# Patient Record
Sex: FEMALE | Race: WHITE
[De-identification: names, ages, dates, MRNs, and addresses within clinical notes are randomized per-mention and may not be internally consistent; named-entity substitution may affect disease eponyms.]

---

## 2017-10-05 ENCOUNTER — HOSPITAL ENCOUNTER (OUTPATIENT)
Dept: HOSPITAL 83 - LAB | Age: 62
Discharge: HOME | End: 2017-10-05
Attending: PHYSICIAN ASSISTANT
Payer: COMMERCIAL

## 2017-10-05 DIAGNOSIS — K21.9: ICD-10-CM

## 2017-10-05 DIAGNOSIS — B19.21: ICD-10-CM

## 2017-10-05 DIAGNOSIS — E55.9: ICD-10-CM

## 2017-10-05 DIAGNOSIS — M05.79: ICD-10-CM

## 2017-10-05 DIAGNOSIS — E11.9: ICD-10-CM

## 2017-10-05 DIAGNOSIS — E03.9: ICD-10-CM

## 2017-10-05 DIAGNOSIS — E78.00: Primary | ICD-10-CM

## 2017-10-05 DIAGNOSIS — Z79.899: ICD-10-CM

## 2017-10-05 LAB
25(OH)D3 SERPL-MCNC: 27.6 NG/ML (ref 30–100)
ALBUMIN SERPL-MCNC: 3.6 GM/DL (ref 3.1–4.5)
ALP SERPL-CCNC: 88 U/L (ref 45–117)
ALT SERPL W P-5'-P-CCNC: 112 U/L (ref 12–78)
AST SERPL-CCNC: 109 IU/L (ref 3–35)
BASOPHILS # BLD AUTO: 0 10*3/UL (ref 0–0.1)
BASOPHILS NFR BLD AUTO: 0.4 % (ref 0–1)
BUN SERPL-MCNC: 17 MG/DL (ref 7–24)
CHLORIDE SERPL-SCNC: 105 MMOL/L (ref 98–107)
CHOLEST SERPL-MCNC: 150 MG/DL (ref ?–200)
CREAT SERPL-MCNC: 0.64 MG/DL (ref 0.55–1.02)
EOSINOPHIL # BLD AUTO: 0 10*3/UL (ref 0–0.4)
EOSINOPHIL # BLD AUTO: 0.2 % (ref 1–4)
ERYTHROCYTE [DISTWIDTH] IN BLOOD BY AUTOMATED COUNT: 12.6 % (ref 0–14.5)
HCT VFR BLD AUTO: 39.1 % (ref 37–47)
HDLC SERPL-MCNC: 81 MG/DL (ref 40–60)
HGB BLD-MCNC: 13.7 G/DL (ref 12–16)
LDLC SERPL DIRECT ASSAY-MCNC: 49 MG/DL (ref 9–159)
LYMPHOCYTES # BLD AUTO: 1.5 10*3/UL (ref 1.3–4.4)
LYMPHOCYTES NFR BLD AUTO: 28.6 % (ref 27–41)
MCH RBC QN AUTO: 32.2 PG (ref 27–31)
MCHC RBC AUTO-ENTMCNC: 35 G/DL (ref 33–37)
MCV RBC AUTO: 92 FL (ref 81–99)
MONOCYTES # BLD AUTO: 0.8 10*3/UL (ref 0.1–1)
MONOCYTES NFR BLD MANUAL: 15.5 % (ref 3–9)
NEUT #: 2.9 10*3/UL (ref 2.3–7.9)
NEUT %: 54.7 % (ref 47–73)
NRBC BLD QL AUTO: 0 % (ref 0–0)
PLATELET # BLD AUTO: 219 10*3/UL (ref 130–400)
PMV BLD AUTO: 9.7 FL (ref 9.6–12.3)
POTASSIUM SERPL-SCNC: 3.8 MMOL/L (ref 3.5–5.1)
PROT SERPL-MCNC: 6.6 GM/DL (ref 6.4–8.2)
RBC # BLD AUTO: 4.25 10*6/UL (ref 4.1–5.1)
SODIUM SERPL-SCNC: 141 MMOL/L (ref 136–145)
T4 FREE SERPL-MCNC: 1.67 NG/DL (ref 0.76–1.46)
TRIGL SERPL-MCNC: 98 MG/DL (ref ?–150)
VLDLC SERPL CALC-MCNC: 20 MG/DL (ref 6–40)
WBC NRBC COR # BLD AUTO: 5.2 10*3/UL (ref 4.8–10.8)

## 2017-10-07 LAB
HCV RNA SERPL NAA+PROBE-ACNC: (no result) IU/ML
HCV RNA SERPL NAA+PROBE-ACNC: (no result) IU/ML
HCV RNA SERPL NAA+PROBE-LOG IU: 7.16 {LOG_IU}/ML

## 2017-12-04 ENCOUNTER — HOSPITAL ENCOUNTER (OUTPATIENT)
Dept: HOSPITAL 83 - RAD | Age: 62
Discharge: HOME | End: 2017-12-04
Attending: ANESTHESIOLOGY
Payer: COMMERCIAL

## 2017-12-04 DIAGNOSIS — Z96.653: ICD-10-CM

## 2017-12-04 DIAGNOSIS — M06.9: ICD-10-CM

## 2017-12-04 DIAGNOSIS — Z96.643: ICD-10-CM

## 2017-12-04 DIAGNOSIS — M47.896: Primary | ICD-10-CM

## 2017-12-04 DIAGNOSIS — G89.29: ICD-10-CM

## 2018-04-30 ENCOUNTER — HOSPITAL ENCOUNTER (OUTPATIENT)
Age: 63
Discharge: HOME | End: 2018-05-02
Payer: COMMERCIAL

## 2018-04-30 LAB
ALBUMIN SERPL-MCNC: 4.6 G/DL (ref 3.5–5.2)
ALP BLD-CCNC: 83 U/L (ref 35–104)
ALT SERPL-CCNC: 62 U/L (ref 0–32)
ANION GAP SERPL CALCULATED.3IONS-SCNC: 17 MMOL/L (ref 7–16)
AST SERPL-CCNC: 65 U/L (ref 0–31)
BASOPHILS ABSOLUTE: 0.03 E9/L (ref 0–0.2)
BASOPHILS RELATIVE PERCENT: 0.7 % (ref 0–2)
BILIRUB SERPL-MCNC: 0.4 MG/DL (ref 0–1.2)
BUN BLDV-MCNC: 10 MG/DL (ref 8–23)
C-REACTIVE PROTEIN: <0.1 MG/DL (ref 0–0.4)
CALCIUM SERPL-MCNC: 9.6 MG/DL (ref 8.6–10.2)
CHLORIDE BLD-SCNC: 101 MMOL/L (ref 98–107)
CO2: 26 MMOL/L (ref 22–29)
CREAT SERPL-MCNC: 0.5 MG/DL (ref 0.5–1)
EOSINOPHILS ABSOLUTE: 0.07 E9/L (ref 0.05–0.5)
EOSINOPHILS RELATIVE PERCENT: 1.7 % (ref 0–6)
GFR AFRICAN AMERICAN: >60
GFR NON-AFRICAN AMERICAN: >60 ML/MIN/1.73
GLUCOSE BLD-MCNC: 264 MG/DL (ref 74–109)
HCT VFR BLD CALC: 39.6 % (ref 34–48)
HEMOGLOBIN: 13.2 G/DL (ref 11.5–15.5)
IMMATURE GRANULOCYTES #: 0 E9/L
IMMATURE GRANULOCYTES %: 0 % (ref 0–5)
LYMPHOCYTES ABSOLUTE: 1.63 E9/L (ref 1.5–4)
LYMPHOCYTES RELATIVE PERCENT: 39.1 % (ref 20–42)
MCH RBC QN AUTO: 32.1 PG (ref 26–35)
MCHC RBC AUTO-ENTMCNC: 33.3 % (ref 32–34.5)
MCV RBC AUTO: 96.4 FL (ref 80–99.9)
MONOCYTES ABSOLUTE: 0.5 E9/L (ref 0.1–0.95)
MONOCYTES RELATIVE PERCENT: 12 % (ref 2–12)
NEUTROPHILS ABSOLUTE: 1.94 E9/L (ref 1.8–7.3)
NEUTROPHILS RELATIVE PERCENT: 46.5 % (ref 43–80)
PDW BLD-RTO: 13.2 FL (ref 11.5–15)
PLATELET # BLD: 195 E9/L (ref 130–450)
PMV BLD AUTO: 9.6 FL (ref 7–12)
POTASSIUM SERPL-SCNC: 3.9 MMOL/L (ref 3.5–5)
RBC # BLD: 4.11 E12/L (ref 3.5–5.5)
SODIUM BLD-SCNC: 144 MMOL/L (ref 132–146)
TOTAL PROTEIN: 6.6 G/DL (ref 6.4–8.3)
WBC # BLD: 4.2 E9/L (ref 4.5–11.5)

## 2018-04-30 PROCEDURE — 36415 COLL VENOUS BLD VENIPUNCTURE: CPT

## 2018-04-30 PROCEDURE — 85025 COMPLETE CBC W/AUTO DIFF WBC: CPT

## 2018-04-30 PROCEDURE — 87522 HEPATITIS C REVRS TRNSCRPJ: CPT

## 2018-04-30 PROCEDURE — 86140 C-REACTIVE PROTEIN: CPT

## 2018-04-30 PROCEDURE — 80053 COMPREHEN METABOLIC PANEL: CPT

## 2018-05-02 LAB
EER HCV RNA QNT PCR: ABNORMAL
HCV RNA QNT REAL-TIME PCR INTERP: DETECTED
HCV RNA, QUANTITATIVE REAL TIME PCR: 7.3 LOG IU
HEPATITIS C RNA PCR QUANT: ABNORMAL IU/ML

## 2018-07-12 ENCOUNTER — HOSPITAL ENCOUNTER (OUTPATIENT)
Age: 63
Discharge: HOME | End: 2018-07-14
Payer: COMMERCIAL

## 2018-07-12 LAB
ALBUMIN SERPL-MCNC: 4.4 G/DL (ref 3.5–5.2)
ALP BLD-CCNC: 84 U/L (ref 35–104)
ALT SERPL-CCNC: 101 U/L (ref 0–32)
ANION GAP SERPL CALCULATED.3IONS-SCNC: 13 MMOL/L (ref 7–16)
AST SERPL-CCNC: 99 U/L (ref 0–31)
BASOPHILS ABSOLUTE: 0.04 E9/L (ref 0–0.2)
BASOPHILS RELATIVE PERCENT: 0.7 % (ref 0–2)
BILIRUB SERPL-MCNC: 0.6 MG/DL (ref 0–1.2)
BUN BLDV-MCNC: 15 MG/DL (ref 8–23)
CALCIUM SERPL-MCNC: 9.7 MG/DL (ref 8.6–10.2)
CHLORIDE BLD-SCNC: 100 MMOL/L (ref 98–107)
CO2: 25 MMOL/L (ref 22–29)
CREAT SERPL-MCNC: 0.6 MG/DL (ref 0.5–1)
EOSINOPHILS ABSOLUTE: 0.06 E9/L (ref 0.05–0.5)
EOSINOPHILS RELATIVE PERCENT: 1 % (ref 0–6)
GFR AFRICAN AMERICAN: >60
GFR NON-AFRICAN AMERICAN: >60 ML/MIN/1.73
GLUCOSE BLD-MCNC: 262 MG/DL (ref 74–109)
HCT VFR BLD CALC: 41.7 % (ref 34–48)
HEMOGLOBIN: 14.2 G/DL (ref 11.5–15.5)
IMMATURE GRANULOCYTES #: 0.02 E9/L
IMMATURE GRANULOCYTES %: 0.3 % (ref 0–5)
LYMPHOCYTES ABSOLUTE: 1.93 E9/L (ref 1.5–4)
LYMPHOCYTES RELATIVE PERCENT: 31.4 % (ref 20–42)
MCH RBC QN AUTO: 32.6 PG (ref 26–35)
MCHC RBC AUTO-ENTMCNC: 34.1 % (ref 32–34.5)
MCV RBC AUTO: 95.6 FL (ref 80–99.9)
MONOCYTES ABSOLUTE: 0.57 E9/L (ref 0.1–0.95)
MONOCYTES RELATIVE PERCENT: 9.3 % (ref 2–12)
NEUTROPHILS ABSOLUTE: 3.53 E9/L (ref 1.8–7.3)
NEUTROPHILS RELATIVE PERCENT: 57.3 % (ref 43–80)
PDW BLD-RTO: 12.3 FL (ref 11.5–15)
PLATELET # BLD: 209 E9/L (ref 130–450)
PMV BLD AUTO: 10 FL (ref 7–12)
POTASSIUM SERPL-SCNC: 4.8 MMOL/L (ref 3.5–5)
RBC # BLD: 4.36 E12/L (ref 3.5–5.5)
SODIUM BLD-SCNC: 138 MMOL/L (ref 132–146)
TOTAL PROTEIN: 7 G/DL (ref 6.4–8.3)
WBC # BLD: 6.2 E9/L (ref 4.5–11.5)

## 2018-07-12 PROCEDURE — 36415 COLL VENOUS BLD VENIPUNCTURE: CPT

## 2018-07-12 PROCEDURE — 85025 COMPLETE CBC W/AUTO DIFF WBC: CPT

## 2018-07-12 PROCEDURE — 80053 COMPREHEN METABOLIC PANEL: CPT

## 2018-10-18 ENCOUNTER — HOSPITAL ENCOUNTER (OUTPATIENT)
Dept: HOSPITAL 83 - US | Age: 63
Discharge: HOME | End: 2018-10-18
Attending: PHYSICIAN ASSISTANT
Payer: COMMERCIAL

## 2018-10-18 DIAGNOSIS — E11.9: ICD-10-CM

## 2018-10-18 DIAGNOSIS — I10: ICD-10-CM

## 2018-10-18 DIAGNOSIS — M79.606: Primary | ICD-10-CM

## 2018-11-01 ENCOUNTER — HOSPITAL ENCOUNTER (OUTPATIENT)
Age: 63
Discharge: HOME | End: 2018-11-03
Payer: COMMERCIAL

## 2018-11-01 LAB
ALBUMIN SERPL-MCNC: 4.5 G/DL (ref 3.5–5.2)
ALP BLD-CCNC: 89 U/L (ref 35–104)
ALT SERPL-CCNC: 110 U/L (ref 0–32)
ANION GAP SERPL CALCULATED.3IONS-SCNC: 16 MMOL/L (ref 7–16)
AST SERPL-CCNC: 119 U/L (ref 0–31)
BASOPHILS ABSOLUTE: 0.04 E9/L (ref 0–0.2)
BASOPHILS RELATIVE PERCENT: 0.7 % (ref 0–2)
BILIRUB SERPL-MCNC: 0.7 MG/DL (ref 0–1.2)
BUN BLDV-MCNC: 13 MG/DL (ref 8–23)
CALCIUM SERPL-MCNC: 9.6 MG/DL (ref 8.6–10.2)
CHLORIDE BLD-SCNC: 97 MMOL/L (ref 98–107)
CO2: 24 MMOL/L (ref 22–29)
CREAT SERPL-MCNC: 0.6 MG/DL (ref 0.5–1)
EOSINOPHILS ABSOLUTE: 0.04 E9/L (ref 0.05–0.5)
EOSINOPHILS RELATIVE PERCENT: 0.7 % (ref 0–6)
GFR AFRICAN AMERICAN: >60
GFR NON-AFRICAN AMERICAN: >60 ML/MIN/1.73
GLUCOSE BLD-MCNC: 224 MG/DL (ref 74–109)
HCT VFR BLD CALC: 44.1 % (ref 34–48)
HEMOGLOBIN: 14.5 G/DL (ref 11.5–15.5)
IMMATURE GRANULOCYTES #: 0.02 E9/L
IMMATURE GRANULOCYTES %: 0.4 % (ref 0–5)
LYMPHOCYTES ABSOLUTE: 1.53 E9/L (ref 1.5–4)
LYMPHOCYTES RELATIVE PERCENT: 27.5 % (ref 20–42)
MCH RBC QN AUTO: 32.5 PG (ref 26–35)
MCHC RBC AUTO-ENTMCNC: 32.9 % (ref 32–34.5)
MCV RBC AUTO: 98.9 FL (ref 80–99.9)
MONOCYTES ABSOLUTE: 0.58 E9/L (ref 0.1–0.95)
MONOCYTES RELATIVE PERCENT: 10.4 % (ref 2–12)
NEUTROPHILS ABSOLUTE: 3.35 E9/L (ref 1.8–7.3)
NEUTROPHILS RELATIVE PERCENT: 60.3 % (ref 43–80)
PDW BLD-RTO: 12.7 FL (ref 11.5–15)
PLATELET # BLD: 238 E9/L (ref 130–450)
PMV BLD AUTO: 10.2 FL (ref 7–12)
POTASSIUM SERPL-SCNC: 4.9 MMOL/L (ref 3.5–5)
RBC # BLD: 4.46 E12/L (ref 3.5–5.5)
SODIUM BLD-SCNC: 137 MMOL/L (ref 132–146)
TOTAL PROTEIN: 6.9 G/DL (ref 6.4–8.3)
VITAMIN D 25-HYDROXY: 58 NG/ML (ref 30–100)
WBC # BLD: 5.6 E9/L (ref 4.5–11.5)

## 2018-11-01 PROCEDURE — 36415 COLL VENOUS BLD VENIPUNCTURE: CPT

## 2018-11-01 PROCEDURE — 85025 COMPLETE CBC W/AUTO DIFF WBC: CPT

## 2018-11-01 PROCEDURE — 80053 COMPREHEN METABOLIC PANEL: CPT

## 2018-11-01 PROCEDURE — 82306 VITAMIN D 25 HYDROXY: CPT

## 2019-06-06 ENCOUNTER — HOSPITAL ENCOUNTER (OUTPATIENT)
Dept: HOSPITAL 83 - LAB | Age: 64
Discharge: HOME | End: 2019-06-06
Attending: NURSE PRACTITIONER
Payer: COMMERCIAL

## 2019-06-06 DIAGNOSIS — B19.20: Primary | ICD-10-CM

## 2019-06-06 LAB
ALBUMIN SERPL-MCNC: 3.9 GM/DL (ref 3.1–4.5)
ALP SERPL-CCNC: 75 U/L (ref 45–117)
ALT SERPL W P-5'-P-CCNC: 31 U/L (ref 12–78)
AST SERPL-CCNC: 20 IU/L (ref 3–35)
ERYTHROCYTE [DISTWIDTH] IN BLOOD BY AUTOMATED COUNT: 13.3 % (ref 0–14.5)
HCT VFR BLD AUTO: 37.5 % (ref 37–47)
HGB BLD-MCNC: 12.2 G/DL (ref 12–16)
INR BLD: 1 (ref 2–3.5)
MCH RBC QN AUTO: 28.9 PG (ref 27–31)
MCHC RBC AUTO-ENTMCNC: 32.5 G/DL (ref 33–37)
MCV RBC AUTO: 88.9 FL (ref 81–99)
NRBC BLD QL AUTO: 0 % (ref 0–0)
PLATELET # BLD AUTO: 227 10*3/UL (ref 130–400)
PMV BLD AUTO: 9.5 FL (ref 9.6–12.3)
PROT SERPL-MCNC: 7.2 GM/DL (ref 6.4–8.2)
RBC # BLD AUTO: 4.22 10*6/UL (ref 4.1–5.1)
WBC NRBC COR # BLD AUTO: 6 10*3/UL (ref 4.8–10.8)

## 2019-07-18 ENCOUNTER — HOSPITAL ENCOUNTER (OUTPATIENT)
Dept: HOSPITAL 83 - LAB | Age: 64
Discharge: HOME | End: 2019-07-18
Attending: NURSE PRACTITIONER
Payer: COMMERCIAL

## 2019-07-18 DIAGNOSIS — B18.2: Primary | ICD-10-CM

## 2019-07-18 LAB
ABSOLUTE BASO #: 0 10*3/UL (ref 0–0.1)
ABSOLUTE EOS #: 0.1 10*3/UL (ref 0–0.4)
ABSOLUTE NEUT #: 4.2 10*3/UL (ref 2.3–7.9)
ALBUMIN SERPL-MCNC: 4 GM/DL (ref 3.1–4.5)
ALBUMIN: 4 GM/DL (ref 3.1–4.5)
ALP BLD-CCNC: 82 U/L (ref 45–117)
ALP SERPL-CCNC: 82 U/L (ref 45–117)
ALT SERPL W P-5'-P-CCNC: 20 U/L (ref 12–78)
ALT SERPL-CCNC: 20 U/L (ref 12–78)
AMMONIA: < 10 UMOL/L (ref 11–32)
AST SERPL-CCNC: 24 IU/L (ref 3–35)
AST SERPL-CCNC: 24 IU/L (ref 3–35)
BASOPHILS # BLD AUTO: 0 10*3/UL (ref 0–0.1)
BASOPHILS %: 0.3 % (ref 0–1)
BASOPHILS NFR BLD AUTO: 0.3 % (ref 0–1)
BILIRUB SERPL-MCNC: 0.5 MG/DL (ref 0.2–1)
BILIRUBIN DIRECT: 0.2 MG/DL (ref 0–0.2)
EOSINOPHIL # BLD AUTO: 0.1 10*3/UL (ref 0–0.4)
EOSINOPHIL # BLD AUTO: 1.6 % (ref 1–4)
EOSINOPHILS %: 1.6 % (ref 1–4)
ERYTHROCYTE [DISTWIDTH] IN BLOOD BY AUTOMATED COUNT: 13.7 % (ref 0–14.5)
HCT VFR BLD AUTO: 40.3 % (ref 37–47)
HCT VFR BLD CALC: 40.3 % (ref 37–47)
HEMOGLOBIN: 12.9 G/DL (ref 12–16)
HGB BLD-MCNC: 12.9 G/DL (ref 12–16)
IMMATURE GRANULOCYTES #: 0 10*3/UL (ref 0–0.1)
IMMATURE GRANULOCYTES: 0.1 % (ref 0–1)
INR BLD: 1 (ref 2–3.5)
INR BLD: 1 (ref 2–3.5)
LYMPHOCYTE %: 27 % (ref 27–41)
LYMPHOCYTES # BLD AUTO: 1.8 10*3/UL (ref 1.3–4.4)
LYMPHOCYTES # BLD: 1.8 10*3/UL (ref 1.3–4.4)
LYMPHOCYTES NFR BLD AUTO: 27 % (ref 27–41)
MCH RBC QN AUTO: 28.9 PG (ref 27–31)
MCH RBC QN AUTO: 28.9 PG (ref 27–31)
MCHC RBC AUTO-ENTMCNC: 32 G/DL (ref 33–37)
MCHC RBC AUTO-ENTMCNC: 32 G/DL (ref 33–37)
MCV RBC AUTO: 90.2 FL (ref 81–99)
MCV RBC AUTO: 90.2 FL (ref 81–99)
MONOCYTES # BLD AUTO: 0.6 10*3/UL (ref 0.1–1)
MONOCYTES # BLD: 0.6 10*3/UL (ref 0.1–1)
MONOCYTES %: 8.5 % (ref 3–9)
MONOCYTES NFR BLD MANUAL: 8.5 % (ref 3–9)
NEUT #: 4.2 10*3/UL (ref 2.3–7.9)
NEUT %: 62.5 % (ref 47–73)
NEUTROPHILS %: 62.5 % (ref 47–73)
NRBC BLD QL AUTO: 0 10*3/UL (ref 0–0)
NUCLEATED RED BLOOD CELLS: 0 % (ref 0–0)
PDW BLD-RTO: 13.7 % (ref 0–14.5)
PLATELET # BLD AUTO: 247 10*3/UL (ref 130–400)
PLATELET # BLD: 247 10*3/UL (ref 130–400)
PMV BLD AUTO: 9.7 FL (ref 9.6–12.3)
PMV BLD AUTO: 9.7 FL (ref 9.6–12.3)
PROT SERPL-MCNC: 7.2 GM/DL (ref 6.4–8.2)
PROTHROMBIN TIME: 11.5 SECONDS (ref 8.9–12.2)
RBC # BLD AUTO: 4.47 10*6/UL (ref 4.1–5.1)
RBC # BLD: 4.47 10*6/UL (ref 4.1–5.1)
TOTAL PROTEIN: 7.2 GM/DL (ref 6.4–8.2)
WBC # BLD: 6.8 10*3/UL (ref 4.8–10.8)
WBC NRBC COR # BLD AUTO: 6.8 10*3/UL (ref 4.8–10.8)

## 2019-08-16 ENCOUNTER — HOSPITAL ENCOUNTER (OUTPATIENT)
Age: 64
Discharge: HOME | End: 2019-08-18
Payer: COMMERCIAL

## 2019-08-16 LAB
ALBUMIN SERPL-MCNC: 4.4 G/DL (ref 3.5–5.2)
ALP BLD-CCNC: 60 U/L (ref 35–104)
ALT SERPL-CCNC: 16 U/L (ref 0–32)
ANION GAP SERPL CALCULATED.3IONS-SCNC: 16 MMOL/L (ref 7–16)
ANISOCYTOSIS: ABNORMAL
AST SERPL-CCNC: 24 U/L (ref 0–31)
ATYPICAL LYMPHOCYTE RELATIVE PERCENT: 1.8 % (ref 0–4)
BASOPHILS ABSOLUTE: 0 E9/L (ref 0–0.2)
BASOPHILS RELATIVE PERCENT: 0.3 % (ref 0–2)
BILIRUB SERPL-MCNC: 0.3 MG/DL (ref 0–1.2)
BUN BLDV-MCNC: 13 MG/DL (ref 8–23)
BURR CELLS: ABNORMAL
CALCIUM SERPL-MCNC: 10 MG/DL (ref 8.6–10.2)
CHLORIDE BLD-SCNC: 101 MMOL/L (ref 98–107)
CO2: 24 MMOL/L (ref 22–29)
CREAT SERPL-MCNC: 0.6 MG/DL (ref 0.5–1)
EOSINOPHILS ABSOLUTE: 0.05 E9/L (ref 0.05–0.5)
EOSINOPHILS RELATIVE PERCENT: 0.9 % (ref 0–6)
GFR AFRICAN AMERICAN: >60
GFR NON-AFRICAN AMERICAN: >60 ML/MIN/1.73
GLUCOSE BLD-MCNC: 196 MG/DL (ref 74–99)
HCT VFR BLD CALC: 42.4 % (ref 34–48)
HEMOGLOBIN: 12.4 G/DL (ref 11.5–15.5)
LYMPHOCYTES ABSOLUTE: 3 E9/L (ref 1.5–4)
LYMPHOCYTES RELATIVE PERCENT: 47.8 % (ref 20–42)
MCH RBC QN AUTO: 29.8 PG (ref 26–35)
MCHC RBC AUTO-ENTMCNC: 29.2 % (ref 32–34.5)
MCV RBC AUTO: 101.9 FL (ref 80–99.9)
MONOCYTES ABSOLUTE: 0.24 E9/L (ref 0.1–0.95)
MONOCYTES RELATIVE PERCENT: 4.4 % (ref 2–12)
NEUTROPHILS ABSOLUTE: 2.7 E9/L (ref 1.8–7.3)
NEUTROPHILS RELATIVE PERCENT: 45.1 % (ref 43–80)
PDW BLD-RTO: 15.6 FL (ref 11.5–15)
PLATELET # BLD: 231 E9/L (ref 130–450)
PMV BLD AUTO: 9.6 FL (ref 7–12)
POIKILOCYTES: ABNORMAL
POLYCHROMASIA: ABNORMAL
POTASSIUM SERPL-SCNC: 4.2 MMOL/L (ref 3.5–5)
RBC # BLD: 4.16 E12/L (ref 3.5–5.5)
SODIUM BLD-SCNC: 141 MMOL/L (ref 132–146)
TOTAL PROTEIN: 6.9 G/DL (ref 6.4–8.3)
WBC # BLD: 6 E9/L (ref 4.5–11.5)

## 2019-08-16 PROCEDURE — 80053 COMPREHEN METABOLIC PANEL: CPT

## 2019-08-16 PROCEDURE — 85025 COMPLETE CBC W/AUTO DIFF WBC: CPT

## 2019-08-16 PROCEDURE — 36415 COLL VENOUS BLD VENIPUNCTURE: CPT

## 2019-08-16 PROCEDURE — 86200 CCP ANTIBODY: CPT

## 2019-08-20 LAB — CCP IGG ANTIBODIES: 37 UNITS (ref 0–19)

## 2019-11-20 ENCOUNTER — HOSPITAL ENCOUNTER (OUTPATIENT)
Dept: HOSPITAL 83 - LAB | Age: 64
Discharge: HOME | End: 2019-11-20
Attending: PHYSICIAN ASSISTANT
Payer: COMMERCIAL

## 2019-11-20 DIAGNOSIS — I10: ICD-10-CM

## 2019-11-20 DIAGNOSIS — Z23: Primary | ICD-10-CM

## 2019-11-20 DIAGNOSIS — E78.00: ICD-10-CM

## 2019-11-20 DIAGNOSIS — E03.9: ICD-10-CM

## 2019-11-20 DIAGNOSIS — E11.9: ICD-10-CM

## 2019-11-20 DIAGNOSIS — B18.2: ICD-10-CM

## 2019-11-20 DIAGNOSIS — R60.0: ICD-10-CM

## 2019-11-20 LAB
ALBUMIN SERPL-MCNC: 4 GM/DL (ref 3.1–4.5)
ALP SERPL-CCNC: 64 U/L (ref 45–117)
ALT SERPL W P-5'-P-CCNC: 24 U/L (ref 12–78)
AST SERPL-CCNC: 27 IU/L (ref 3–35)
BUN SERPL-MCNC: 17 MG/DL (ref 7–24)
CHLORIDE SERPL-SCNC: 104 MMOL/L (ref 98–107)
CHOLEST SERPL-MCNC: 237 MG/DL (ref ?–200)
CREAT SERPL-MCNC: 0.68 MG/DL (ref 0.55–1.02)
ERYTHROCYTE [DISTWIDTH] IN BLOOD BY AUTOMATED COUNT: 12.6 % (ref 0–14.5)
HCT VFR BLD AUTO: 38.8 % (ref 37–47)
HDLC SERPL-MCNC: 76 MG/DL (ref 40–60)
HGB BLD-MCNC: 12.5 G/DL (ref 12–16)
LDLC SERPL DIRECT ASSAY-MCNC: 134 MG/DL (ref 9–159)
MCH RBC QN AUTO: 29.2 PG (ref 27–31)
MCHC RBC AUTO-ENTMCNC: 32.2 G/DL (ref 33–37)
MCV RBC AUTO: 90.7 FL (ref 81–99)
NRBC BLD QL AUTO: 0 10*3/UL (ref 0–0)
PLATELET # BLD AUTO: 255 10*3/UL (ref 130–400)
PMV BLD AUTO: 9.3 FL (ref 9.6–12.3)
POTASSIUM SERPL-SCNC: 3.8 MMOL/L (ref 3.5–5.1)
PROT SERPL-MCNC: 7.5 GM/DL (ref 6.4–8.2)
RBC # BLD AUTO: 4.28 10*6/UL (ref 4.1–5.1)
SODIUM SERPL-SCNC: 139 MMOL/L (ref 136–145)
T4 FREE SERPL-MCNC: 0.88 NG/DL (ref 0.76–1.46)
TRIGL SERPL-MCNC: 134 MG/DL (ref ?–150)
VLDLC SERPL CALC-MCNC: 27 MG/DL (ref 6–40)
WBC NRBC COR # BLD AUTO: 6 10*3/UL (ref 4.8–10.8)

## 2019-12-13 ENCOUNTER — HOSPITAL ENCOUNTER (OUTPATIENT)
Dept: HOSPITAL 83 - US | Age: 64
Discharge: HOME | End: 2019-12-13
Attending: NURSE PRACTITIONER
Payer: COMMERCIAL

## 2019-12-13 DIAGNOSIS — K74.60: Primary | ICD-10-CM

## 2019-12-13 DIAGNOSIS — B19.20: ICD-10-CM

## 2019-12-13 LAB
ABSOLUTE BASO #: 0 10*3/UL (ref 0–0.1)
ABSOLUTE EOS #: 0.1 10*3/UL (ref 0–0.4)
ABSOLUTE NEUT #: 3.8 10*3/UL (ref 2.3–7.9)
ALBUMIN SERPL-MCNC: 3.8 GM/DL (ref 3.1–4.5)
ALBUMIN: 3.8 GM/DL (ref 3.1–4.5)
ALP BLD-CCNC: 65 U/L (ref 45–117)
ALP SERPL-CCNC: 65 U/L (ref 45–117)
ALT SERPL W P-5'-P-CCNC: 21 U/L (ref 12–78)
ALT SERPL-CCNC: 21 U/L (ref 12–78)
AMMONIA: < 10 UMOL/L (ref 11–32)
AST SERPL-CCNC: 18 IU/L (ref 3–35)
AST SERPL-CCNC: 18 IU/L (ref 3–35)
BASOPHILS # BLD AUTO: 0 10*3/UL (ref 0–0.1)
BASOPHILS %: 0.5 % (ref 0–1)
BASOPHILS NFR BLD AUTO: 0.5 % (ref 0–1)
BILIRUB SERPL-MCNC: 0.4 MG/DL (ref 0.2–1)
BILIRUBIN DIRECT: 0.2 MG/DL (ref 0–0.2)
EOSINOPHIL # BLD AUTO: 0.1 10*3/UL (ref 0–0.4)
EOSINOPHIL # BLD AUTO: 2 % (ref 1–4)
EOSINOPHILS %: 2 % (ref 1–4)
ERYTHROCYTE [DISTWIDTH] IN BLOOD BY AUTOMATED COUNT: 13.2 % (ref 0–14.5)
HCT VFR BLD AUTO: 37.6 % (ref 37–47)
HCT VFR BLD CALC: 37.6 % (ref 37–47)
HEMOGLOBIN: 12.1 G/DL (ref 12–16)
HGB BLD-MCNC: 12.1 G/DL (ref 12–16)
IMMATURE GRANULOCYTES #: 0 10*3/UL (ref 0–0.1)
IMMATURE GRANULOCYTES: 0.3 % (ref 0–1)
LYMPHOCYTE %: 28.8 % (ref 27–41)
LYMPHOCYTES # BLD AUTO: 1.9 10*3/UL (ref 1.3–4.4)
LYMPHOCYTES # BLD: 1.9 10*3/UL (ref 1.3–4.4)
LYMPHOCYTES NFR BLD AUTO: 28.8 % (ref 27–41)
MCH RBC QN AUTO: 29.4 PG (ref 27–31)
MCH RBC QN AUTO: 29.4 PG (ref 27–31)
MCHC RBC AUTO-ENTMCNC: 32.2 G/DL (ref 33–37)
MCHC RBC AUTO-ENTMCNC: 32.2 G/DL (ref 33–37)
MCV RBC AUTO: 91.3 FL (ref 81–99)
MCV RBC AUTO: 91.3 FL (ref 81–99)
MONOCYTES # BLD AUTO: 0.7 10*3/UL (ref 0.1–1)
MONOCYTES # BLD: 0.7 10*3/UL (ref 0.1–1)
MONOCYTES %: 11.2 % (ref 3–9)
MONOCYTES NFR BLD MANUAL: 11.2 % (ref 3–9)
NEUT #: 3.8 10*3/UL (ref 2.3–7.9)
NEUT %: 57.2 % (ref 47–73)
NEUTROPHILS %: 57.2 % (ref 47–73)
NRBC BLD QL AUTO: 0 10*3/UL (ref 0–0)
NUCLEATED RED BLOOD CELLS: 0 % (ref 0–0)
PDW BLD-RTO: 13.2 % (ref 0–14.5)
PLATELET # BLD AUTO: 229 10*3/UL (ref 130–400)
PLATELET # BLD: 229 10*3/UL (ref 130–400)
PMV BLD AUTO: 9.4 FL (ref 9.6–12.3)
PMV BLD AUTO: 9.4 FL (ref 9.6–12.3)
PROT SERPL-MCNC: 6.9 GM/DL (ref 6.4–8.2)
RBC # BLD AUTO: 4.12 10*6/UL (ref 4.1–5.1)
RBC # BLD: 4.12 10*6/UL (ref 4.1–5.1)
TOTAL PROTEIN: 6.9 GM/DL (ref 6.4–8.2)
WBC # BLD: 6.6 10*3/UL (ref 4.8–10.8)
WBC NRBC COR # BLD AUTO: 6.6 10*3/UL (ref 4.8–10.8)

## 2019-12-14 LAB — AFP-TM SERPL-MCNC: 3.2 NG/ML (ref 0–8.3)

## 2019-12-16 ENCOUNTER — HOSPITAL ENCOUNTER (OUTPATIENT)
Age: 64
Discharge: HOME | End: 2019-12-18
Payer: COMMERCIAL

## 2019-12-16 LAB — AFP-TUMOR MARKER: 3.2 NG/ML (ref 0–8.3)

## 2019-12-16 PROCEDURE — 85025 COMPLETE CBC W/AUTO DIFF WBC: CPT

## 2019-12-16 PROCEDURE — 80053 COMPREHEN METABOLIC PANEL: CPT

## 2019-12-16 PROCEDURE — 36415 COLL VENOUS BLD VENIPUNCTURE: CPT

## 2019-12-16 PROCEDURE — 86140 C-REACTIVE PROTEIN: CPT

## 2019-12-17 LAB
ALBUMIN SERPL-MCNC: 4.3 G/DL (ref 3.5–5.2)
ALP BLD-CCNC: 56 U/L (ref 35–104)
ALT SERPL-CCNC: 14 U/L (ref 0–32)
ANION GAP SERPL CALCULATED.3IONS-SCNC: 13 MMOL/L (ref 7–16)
AST SERPL-CCNC: 23 U/L (ref 0–31)
BASOPHILS ABSOLUTE: 0.03 E9/L (ref 0–0.2)
BASOPHILS RELATIVE PERCENT: 0.4 % (ref 0–2)
BILIRUB SERPL-MCNC: 0.3 MG/DL (ref 0–1.2)
BUN BLDV-MCNC: 12 MG/DL (ref 8–23)
C-REACTIVE PROTEIN: <0.1 MG/DL (ref 0–0.4)
CALCIUM SERPL-MCNC: 9.8 MG/DL (ref 8.6–10.2)
CHLORIDE BLD-SCNC: 103 MMOL/L (ref 98–107)
CO2: 26 MMOL/L (ref 22–29)
CREAT SERPL-MCNC: 0.6 MG/DL (ref 0.5–1)
EOSINOPHILS ABSOLUTE: 0.12 E9/L (ref 0.05–0.5)
EOSINOPHILS RELATIVE PERCENT: 1.8 % (ref 0–6)
GFR AFRICAN AMERICAN: >60
GFR NON-AFRICAN AMERICAN: >60 ML/MIN/1.73
GLUCOSE BLD-MCNC: 111 MG/DL (ref 74–99)
HCT VFR BLD CALC: 38.6 % (ref 34–48)
HEMOGLOBIN: 11.6 G/DL (ref 11.5–15.5)
IMMATURE GRANULOCYTES #: 0.02 E9/L
IMMATURE GRANULOCYTES %: 0.3 % (ref 0–5)
LYMPHOCYTES ABSOLUTE: 1.7 E9/L (ref 1.5–4)
LYMPHOCYTES RELATIVE PERCENT: 24.9 % (ref 20–42)
MCH RBC QN AUTO: 28.4 PG (ref 26–35)
MCHC RBC AUTO-ENTMCNC: 30.1 % (ref 32–34.5)
MCV RBC AUTO: 94.6 FL (ref 80–99.9)
MONOCYTES ABSOLUTE: 0.64 E9/L (ref 0.1–0.95)
MONOCYTES RELATIVE PERCENT: 9.4 % (ref 2–12)
NEUTROPHILS ABSOLUTE: 4.31 E9/L (ref 1.8–7.3)
NEUTROPHILS RELATIVE PERCENT: 63.2 % (ref 43–80)
PDW BLD-RTO: 13.3 FL (ref 11.5–15)
PLATELET # BLD: 245 E9/L (ref 130–450)
PMV BLD AUTO: 10.1 FL (ref 7–12)
POTASSIUM SERPL-SCNC: 4.1 MMOL/L (ref 3.5–5)
RBC # BLD: 4.08 E12/L (ref 3.5–5.5)
SODIUM BLD-SCNC: 142 MMOL/L (ref 132–146)
TOTAL PROTEIN: 7 G/DL (ref 6.4–8.3)
WBC # BLD: 6.8 E9/L (ref 4.5–11.5)

## 2020-03-23 ENCOUNTER — HOSPITAL ENCOUNTER (OUTPATIENT)
Age: 65
Discharge: HOME | End: 2020-03-25
Payer: COMMERCIAL

## 2020-03-23 LAB
ALBUMIN SERPL-MCNC: 4 G/DL (ref 3.5–5.2)
ALP BLD-CCNC: 75 U/L (ref 35–104)
ALT SERPL-CCNC: 7 U/L (ref 0–32)
ANION GAP SERPL CALCULATED.3IONS-SCNC: 13 MMOL/L (ref 7–16)
AST SERPL-CCNC: 18 U/L (ref 0–31)
BASOPHILS ABSOLUTE: 0.05 E9/L (ref 0–0.2)
BASOPHILS RELATIVE PERCENT: 0.6 % (ref 0–2)
BILIRUB SERPL-MCNC: 0.3 MG/DL (ref 0–1.2)
BUN BLDV-MCNC: 12 MG/DL (ref 8–23)
CALCIUM SERPL-MCNC: 10.1 MG/DL (ref 8.6–10.2)
CHLORIDE BLD-SCNC: 97 MMOL/L (ref 98–107)
CO2: 27 MMOL/L (ref 22–29)
CREAT SERPL-MCNC: 0.5 MG/DL (ref 0.5–1)
EOSINOPHILS ABSOLUTE: 0.16 E9/L (ref 0.05–0.5)
EOSINOPHILS RELATIVE PERCENT: 2 % (ref 0–6)
GFR AFRICAN AMERICAN: >60
GFR NON-AFRICAN AMERICAN: >60 ML/MIN/1.73
GLUCOSE BLD-MCNC: 162 MG/DL (ref 74–99)
HCT VFR BLD CALC: 40.5 % (ref 34–48)
HEMOGLOBIN: 12 G/DL (ref 11.5–15.5)
IMMATURE GRANULOCYTES #: 0.03 E9/L
IMMATURE GRANULOCYTES %: 0.4 % (ref 0–5)
LYMPHOCYTES ABSOLUTE: 1.58 E9/L (ref 1.5–4)
LYMPHOCYTES RELATIVE PERCENT: 19.3 % (ref 20–42)
MCH RBC QN AUTO: 25.5 PG (ref 26–35)
MCHC RBC AUTO-ENTMCNC: 29.6 % (ref 32–34.5)
MCV RBC AUTO: 86.2 FL (ref 80–99.9)
MONOCYTES ABSOLUTE: 0.7 E9/L (ref 0.1–0.95)
MONOCYTES RELATIVE PERCENT: 8.5 % (ref 2–12)
NEUTROPHILS ABSOLUTE: 5.67 E9/L (ref 1.8–7.3)
NEUTROPHILS RELATIVE PERCENT: 69.2 % (ref 43–80)
PDW BLD-RTO: 13.7 FL (ref 11.5–15)
PLATELET # BLD: 400 E9/L (ref 130–450)
PMV BLD AUTO: 9.4 FL (ref 7–12)
POTASSIUM SERPL-SCNC: 4.1 MMOL/L (ref 3.5–5)
RBC # BLD: 4.7 E12/L (ref 3.5–5.5)
SODIUM BLD-SCNC: 137 MMOL/L (ref 132–146)
TOTAL PROTEIN: 6.9 G/DL (ref 6.4–8.3)
WBC # BLD: 8.2 E9/L (ref 4.5–11.5)

## 2020-03-23 PROCEDURE — 82784 ASSAY IGA/IGD/IGG/IGM EACH: CPT

## 2020-03-23 PROCEDURE — 80053 COMPREHEN METABOLIC PANEL: CPT

## 2020-03-23 PROCEDURE — 85025 COMPLETE CBC W/AUTO DIFF WBC: CPT

## 2020-03-25 LAB
IGA: 137 MG/DL (ref 70–400)
IGG: 568 MG/DL (ref 700–1600)
IGM: 70 MG/DL (ref 40–230)

## 2020-08-27 ENCOUNTER — HOSPITAL ENCOUNTER (OUTPATIENT)
Age: 65
Discharge: HOME | End: 2020-08-29
Payer: COMMERCIAL

## 2020-08-27 LAB
BASOPHILS ABSOLUTE: 0.06 E9/L (ref 0–0.2)
BASOPHILS RELATIVE PERCENT: 0.6 % (ref 0–2)
EOSINOPHILS ABSOLUTE: 0.12 E9/L (ref 0.05–0.5)
EOSINOPHILS RELATIVE PERCENT: 1.3 % (ref 0–6)
HCT VFR BLD CALC: 42.9 % (ref 34–48)
HEMOGLOBIN: 12.9 G/DL (ref 11.5–15.5)
IMMATURE GRANULOCYTES #: 0.03 E9/L
IMMATURE GRANULOCYTES %: 0.3 % (ref 0–5)
LYMPHOCYTES ABSOLUTE: 1.75 E9/L (ref 1.5–4)
LYMPHOCYTES RELATIVE PERCENT: 18.8 % (ref 20–42)
MCH RBC QN AUTO: 26.8 PG (ref 26–35)
MCHC RBC AUTO-ENTMCNC: 30.1 % (ref 32–34.5)
MCV RBC AUTO: 89 FL (ref 80–99.9)
MONOCYTES ABSOLUTE: 0.79 E9/L (ref 0.1–0.95)
MONOCYTES RELATIVE PERCENT: 8.5 % (ref 2–12)
NEUTROPHILS ABSOLUTE: 6.54 E9/L (ref 1.8–7.3)
NEUTROPHILS RELATIVE PERCENT: 70.5 % (ref 43–80)
PDW BLD-RTO: 14.3 FL (ref 11.5–15)
PLATELET # BLD: 273 E9/L (ref 130–450)
PMV BLD AUTO: 10.4 FL (ref 7–12)
RBC # BLD: 4.82 E12/L (ref 3.5–5.5)
WBC # BLD: 9.3 E9/L (ref 4.5–11.5)

## 2020-08-27 PROCEDURE — 86431 RHEUMATOID FACTOR QUANT: CPT

## 2020-08-27 PROCEDURE — 36415 COLL VENOUS BLD VENIPUNCTURE: CPT

## 2020-08-27 PROCEDURE — 87522 HEPATITIS C REVRS TRNSCRPJ: CPT

## 2020-08-27 PROCEDURE — 80053 COMPREHEN METABOLIC PANEL: CPT

## 2020-08-27 PROCEDURE — 85025 COMPLETE CBC W/AUTO DIFF WBC: CPT

## 2020-08-27 PROCEDURE — 86200 CCP ANTIBODY: CPT

## 2020-08-28 LAB
ALBUMIN SERPL-MCNC: 4.4 G/DL (ref 3.5–5.2)
ALP BLD-CCNC: 72 U/L (ref 35–104)
ALT SERPL-CCNC: 10 U/L (ref 0–32)
ANION GAP SERPL CALCULATED.3IONS-SCNC: 15 MMOL/L (ref 7–16)
AST SERPL-CCNC: 19 U/L (ref 0–31)
BILIRUB SERPL-MCNC: 0.3 MG/DL (ref 0–1.2)
BUN BLDV-MCNC: 18 MG/DL (ref 8–23)
CALCIUM SERPL-MCNC: 10.3 MG/DL (ref 8.6–10.2)
CHLORIDE BLD-SCNC: 99 MMOL/L (ref 98–107)
CO2: 26 MMOL/L (ref 22–29)
CREAT SERPL-MCNC: 0.6 MG/DL (ref 0.5–1)
GFR AFRICAN AMERICAN: >60
GFR NON-AFRICAN AMERICAN: >60 ML/MIN/1.73
GLUCOSE BLD-MCNC: 219 MG/DL (ref 74–99)
POTASSIUM SERPL-SCNC: 4.5 MMOL/L (ref 3.5–5)
RHEUMATOID FACTOR: <10 IU/ML (ref 0–13)
SODIUM BLD-SCNC: 140 MMOL/L (ref 132–146)
TOTAL PROTEIN: 6.9 G/DL (ref 6.4–8.3)

## 2020-08-30 LAB — CCP IGG ANTIBODIES: 27 UNITS (ref 0–19)

## 2020-08-31 LAB
HCV QNT BY NAAT IU/ML: NOT DETECTED IU/ML
HCV QNT BY NAAT LOG IU/ML: NOT DETECTED LOG IU/ML
INTERPRETATION: NOT DETECTED

## 2021-01-28 ENCOUNTER — HOSPITAL ENCOUNTER (EMERGENCY)
Dept: HOSPITAL 83 - ED | Age: 66
Discharge: HOME | End: 2021-01-28
Payer: COMMERCIAL

## 2021-01-28 VITALS — BODY MASS INDEX: 26.52 KG/M2 | HEIGHT: 65.98 IN | WEIGHT: 165 LBS

## 2021-01-28 VITALS — DIASTOLIC BLOOD PRESSURE: 61 MMHG

## 2021-01-28 DIAGNOSIS — Z88.0: ICD-10-CM

## 2021-01-28 DIAGNOSIS — Z90.49: ICD-10-CM

## 2021-01-28 DIAGNOSIS — Z88.1: ICD-10-CM

## 2021-01-28 DIAGNOSIS — Z90.710: ICD-10-CM

## 2021-01-28 DIAGNOSIS — U07.1: Primary | ICD-10-CM

## 2021-01-28 DIAGNOSIS — Z98.890: ICD-10-CM

## 2021-01-28 DIAGNOSIS — Z79.899: ICD-10-CM

## 2021-01-28 DIAGNOSIS — J32.1: ICD-10-CM

## 2021-02-11 ENCOUNTER — HOSPITAL ENCOUNTER (EMERGENCY)
Dept: HOSPITAL 83 - ED | Age: 66
Discharge: HOME | End: 2021-02-11
Payer: COMMERCIAL

## 2021-02-11 VITALS — BODY MASS INDEX: 25.07 KG/M2 | WEIGHT: 156 LBS | HEIGHT: 65.98 IN

## 2021-02-11 VITALS — DIASTOLIC BLOOD PRESSURE: 70 MMHG | SYSTOLIC BLOOD PRESSURE: 108 MMHG

## 2021-02-11 DIAGNOSIS — Z88.8: ICD-10-CM

## 2021-02-11 DIAGNOSIS — Z79.4: ICD-10-CM

## 2021-02-11 DIAGNOSIS — R11.2: Primary | ICD-10-CM

## 2021-02-11 DIAGNOSIS — Z79.899: ICD-10-CM

## 2021-02-11 DIAGNOSIS — Z88.0: ICD-10-CM

## 2021-02-11 LAB
ALBUMIN SERPL-MCNC: 3.1 GM/DL (ref 3.1–4.5)
ALP SERPL-CCNC: 78 U/L (ref 45–117)
ALT SERPL W P-5'-P-CCNC: 21 U/L (ref 12–78)
AST SERPL-CCNC: 19 IU/L (ref 3–35)
BASOPHILS # BLD AUTO: 0 10*3/UL (ref 0–0.1)
BASOPHILS NFR BLD AUTO: 0.3 % (ref 0–1)
BUN SERPL-MCNC: 14 MG/DL (ref 7–24)
CHLORIDE SERPL-SCNC: 101 MMOL/L (ref 98–107)
CREAT SERPL-MCNC: 0.69 MG/DL (ref 0.55–1.02)
EOSINOPHIL # BLD AUTO: 0 10*3/UL (ref 0–0.4)
EOSINOPHIL # BLD AUTO: 0.3 % (ref 1–4)
ERYTHROCYTE [DISTWIDTH] IN BLOOD BY AUTOMATED COUNT: 13.1 % (ref 0–14.5)
HCT VFR BLD AUTO: 38.3 % (ref 37–47)
LIPASE SERPL-CCNC: 57 U/L (ref 73–393)
LYMPHOCYTES # BLD AUTO: 1.3 10*3/UL (ref 1.3–4.4)
LYMPHOCYTES NFR BLD AUTO: 16.7 % (ref 27–41)
MCH RBC QN AUTO: 26.3 PG (ref 27–31)
MCHC RBC AUTO-ENTMCNC: 31.3 G/DL (ref 33–37)
MCV RBC AUTO: 84 FL (ref 81–99)
MONOCYTES # BLD AUTO: 1 10*3/UL (ref 0.1–1)
MONOCYTES NFR BLD MANUAL: 12.7 % (ref 3–9)
NEUT #: 5.2 10*3/UL (ref 2.3–7.9)
NEUT %: 69.6 % (ref 47–73)
NRBC BLD QL AUTO: 0 % (ref 0–0)
PLATELET # BLD AUTO: 371 10*3/UL (ref 130–400)
PMV BLD AUTO: 8.9 FL (ref 9.6–12.3)
POTASSIUM SERPL-SCNC: 4.2 MMOL/L (ref 3.5–5.1)
PROT SERPL-MCNC: 7.5 GM/DL (ref 6.4–8.2)
RBC # BLD AUTO: 4.56 10*6/UL (ref 4.1–5.1)
SODIUM SERPL-SCNC: 135 MMOL/L (ref 136–145)
WBC NRBC COR # BLD AUTO: 7.5 10*3/UL (ref 4.8–10.8)

## 2021-02-22 ENCOUNTER — HOSPITAL ENCOUNTER (OUTPATIENT)
Dept: HOSPITAL 83 - RAD | Age: 66
Discharge: HOME | End: 2021-02-22
Attending: PHYSICIAN ASSISTANT
Payer: COMMERCIAL

## 2021-02-22 DIAGNOSIS — U07.1: ICD-10-CM

## 2021-02-22 DIAGNOSIS — L40.9: ICD-10-CM

## 2021-02-22 DIAGNOSIS — J98.11: Primary | ICD-10-CM

## 2021-02-22 DIAGNOSIS — E78.00: ICD-10-CM

## 2021-02-22 DIAGNOSIS — I01.1: ICD-10-CM

## 2021-02-22 DIAGNOSIS — I10: ICD-10-CM

## 2021-02-22 DIAGNOSIS — E03.9: ICD-10-CM

## 2021-02-22 DIAGNOSIS — E11.9: ICD-10-CM

## 2021-02-22 DIAGNOSIS — R91.8: ICD-10-CM

## 2021-03-18 ENCOUNTER — HOSPITAL ENCOUNTER (OUTPATIENT)
Dept: HOSPITAL 83 - RAD | Age: 66
Discharge: HOME | End: 2021-03-18
Attending: PHYSICIAN ASSISTANT
Payer: COMMERCIAL

## 2021-03-18 DIAGNOSIS — U07.1: Primary | ICD-10-CM

## 2021-03-26 ENCOUNTER — HOSPITAL ENCOUNTER (OUTPATIENT)
Dept: HOSPITAL 83 - CT | Age: 66
Discharge: HOME | End: 2021-03-26
Attending: PHYSICIAN ASSISTANT
Payer: COMMERCIAL

## 2021-03-26 DIAGNOSIS — J98.4: ICD-10-CM

## 2021-03-26 DIAGNOSIS — J18.1: Primary | ICD-10-CM

## 2021-03-26 DIAGNOSIS — K74.60: ICD-10-CM

## 2021-05-18 LAB
AMMONIA: < 10 UMOL/L (ref 11–32)
CHOLESTEROL: 213 MG/DL
ESTIMATED AVERAGE GLUCOSE: 194
FOLIC ACID, SERUM: 14.99 NG/ML
HBA1C MFR BLD: 8.4 % (ref 4.8–5.6)
HDLC SERPL-MCNC: 51 MG/DL (ref 40–60)
LDL CHOLESTEROL: 139 MG/DL (ref 9–159)
TRIGL SERPL-MCNC: 117 MG/DL
TSH SERPL DL<=0.05 MIU/L-ACNC: 15 UIU/ML (ref 0.36–4.75)
VITAMIN B-12: 888 PG/ML (ref 247–911)
VLDLC SERPL CALC-MCNC: 23 MG/DL (ref 6–40)

## 2021-07-22 ENCOUNTER — HOSPITAL ENCOUNTER (OUTPATIENT)
Dept: HOSPITAL 83 - CT | Age: 66
Discharge: HOME | End: 2021-07-22
Attending: INTERNAL MEDICINE
Payer: COMMERCIAL

## 2021-07-22 DIAGNOSIS — R91.8: ICD-10-CM

## 2021-07-22 DIAGNOSIS — J12.82: ICD-10-CM

## 2021-07-22 DIAGNOSIS — K74.60: Primary | ICD-10-CM

## 2021-07-22 DIAGNOSIS — Z86.16: ICD-10-CM

## 2021-07-22 DIAGNOSIS — J84.10: ICD-10-CM

## 2021-07-22 DIAGNOSIS — M89.8X9: ICD-10-CM

## 2021-11-08 ENCOUNTER — HOSPITAL ENCOUNTER (OUTPATIENT)
Dept: HOSPITAL 83 - MAMMO | Age: 66
Discharge: HOME | End: 2021-11-08
Attending: PHYSICIAN ASSISTANT
Payer: COMMERCIAL

## 2021-11-08 DIAGNOSIS — N64.89: ICD-10-CM

## 2021-11-08 DIAGNOSIS — M71.22: ICD-10-CM

## 2021-11-08 DIAGNOSIS — Z12.31: Primary | ICD-10-CM

## 2021-11-15 ENCOUNTER — HOSPITAL ENCOUNTER (EMERGENCY)
Dept: HOSPITAL 83 - ED | Age: 66
Discharge: HOME | End: 2021-11-15
Payer: COMMERCIAL

## 2021-11-15 VITALS — DIASTOLIC BLOOD PRESSURE: 75 MMHG | SYSTOLIC BLOOD PRESSURE: 145 MMHG

## 2021-11-15 VITALS — WEIGHT: 156 LBS | HEIGHT: 55 IN

## 2021-11-15 DIAGNOSIS — L03.032: ICD-10-CM

## 2021-11-15 DIAGNOSIS — Z79.899: ICD-10-CM

## 2021-11-15 DIAGNOSIS — Z88.0: ICD-10-CM

## 2021-11-15 DIAGNOSIS — U07.1: Primary | ICD-10-CM

## 2021-11-15 DIAGNOSIS — Z88.1: ICD-10-CM

## 2021-11-15 LAB
ALBUMIN SERPL-MCNC: 3.4 GM/DL (ref 3.1–4.5)
ALP SERPL-CCNC: 90 U/L (ref 45–117)
ALT SERPL W P-5'-P-CCNC: 48 U/L (ref 12–78)
AST SERPL-CCNC: 43 IU/L (ref 3–35)
BASOPHILS # BLD AUTO: 0 10*3/UL (ref 0–0.1)
BASOPHILS NFR BLD AUTO: 0.3 % (ref 0–1)
BUN SERPL-MCNC: 14 MG/DL (ref 7–24)
CHLORIDE SERPL-SCNC: 103 MMOL/L (ref 98–107)
CREAT SERPL-MCNC: 0.52 MG/DL (ref 0.55–1.02)
EOSINOPHIL # BLD AUTO: 0.1 10*3/UL (ref 0–0.4)
EOSINOPHIL # BLD AUTO: 2.1 % (ref 1–4)
ERYTHROCYTE [DISTWIDTH] IN BLOOD BY AUTOMATED COUNT: 14.5 % (ref 0–14.5)
HCT VFR BLD AUTO: 39.2 % (ref 37–47)
LYMPHOCYTES # BLD AUTO: 1.2 10*3/UL (ref 1.3–4.4)
LYMPHOCYTES NFR BLD AUTO: 20.7 % (ref 27–41)
MCH RBC QN AUTO: 27.8 PG (ref 27–31)
MCHC RBC AUTO-ENTMCNC: 32.4 G/DL (ref 33–37)
MCV RBC AUTO: 85.8 FL (ref 81–99)
MONOCYTES # BLD AUTO: 0.6 10*3/UL (ref 0.1–1)
MONOCYTES NFR BLD MANUAL: 10.2 % (ref 3–9)
NEUT #: 3.8 10*3/UL (ref 2.3–7.9)
NEUT %: 66.4 % (ref 47–73)
NRBC BLD QL AUTO: 0 10*3/UL (ref 0–0)
PLATELET # BLD AUTO: 216 10*3/UL (ref 130–400)
PMV BLD AUTO: 8.8 FL (ref 9.6–12.3)
POTASSIUM SERPL-SCNC: 4.6 MMOL/L (ref 3.5–5.1)
PROT SERPL-MCNC: 6.9 GM/DL (ref 6.4–8.2)
RBC # BLD AUTO: 4.57 10*6/UL (ref 4.1–5.1)
SODIUM SERPL-SCNC: 139 MMOL/L (ref 136–145)
WBC NRBC COR # BLD AUTO: 5.8 10*3/UL (ref 4.8–10.8)

## 2022-04-15 ENCOUNTER — HOSPITAL ENCOUNTER (EMERGENCY)
Dept: HOSPITAL 83 - ED | Age: 67
Discharge: HOME | End: 2022-04-15
Payer: COMMERCIAL

## 2022-04-15 VITALS — DIASTOLIC BLOOD PRESSURE: 77 MMHG | SYSTOLIC BLOOD PRESSURE: 151 MMHG

## 2022-04-15 VITALS — HEIGHT: 55 IN

## 2022-04-15 DIAGNOSIS — Z88.1: ICD-10-CM

## 2022-04-15 DIAGNOSIS — Z90.710: ICD-10-CM

## 2022-04-15 DIAGNOSIS — Z79.899: ICD-10-CM

## 2022-04-15 DIAGNOSIS — N39.0: Primary | ICD-10-CM

## 2022-04-15 DIAGNOSIS — Z98.890: ICD-10-CM

## 2022-04-15 DIAGNOSIS — Z88.0: ICD-10-CM

## 2022-04-15 LAB
BACTERIA #/AREA URNS HPF: (no result) /[HPF]
EPI CELLS #/AREA URNS HPF: (no result) /[HPF]
GLUCOSE UR QL: (no result)
HGB UR QL STRIP: (no result)
LEUKOCYTE ESTERASE UR QL STRIP: (no result)
PH UR STRIP: 6.5 [PH] (ref 4.5–8)
RBC #/AREA URNS HPF: (no result) RBC/HPF (ref 0–2)
SP GR UR: >= 1.03 (ref 1–1.03)
UROBILINOGEN UR STRIP-MCNC: 1 E.U./DL (ref 0–1)
WBC #/AREA URNS HPF: (no result) WBC/HPF (ref 0–5)

## 2022-05-26 ENCOUNTER — HOSPITAL ENCOUNTER (EMERGENCY)
Dept: HOSPITAL 83 - ED | Age: 67
Discharge: HOME | End: 2022-05-26
Payer: COMMERCIAL

## 2022-05-26 VITALS — WEIGHT: 160 LBS | HEIGHT: 65.98 IN | BODY MASS INDEX: 25.71 KG/M2

## 2022-05-26 VITALS — DIASTOLIC BLOOD PRESSURE: 84 MMHG | SYSTOLIC BLOOD PRESSURE: 133 MMHG

## 2022-05-26 DIAGNOSIS — Z90.710: ICD-10-CM

## 2022-05-26 DIAGNOSIS — B02.9: Primary | ICD-10-CM

## 2022-05-26 DIAGNOSIS — Z90.49: ICD-10-CM

## 2022-05-26 DIAGNOSIS — Z79.899: ICD-10-CM

## 2022-05-26 DIAGNOSIS — Z88.0: ICD-10-CM

## 2022-05-26 DIAGNOSIS — Z88.1: ICD-10-CM

## 2022-05-26 DIAGNOSIS — Z98.890: ICD-10-CM

## 2022-06-25 ENCOUNTER — HOSPITAL ENCOUNTER (INPATIENT)
Dept: HOSPITAL 83 - ED | Age: 67
LOS: 6 days | Discharge: HOME HEALTH | DRG: 720 | End: 2022-07-01
Attending: STUDENT IN AN ORGANIZED HEALTH CARE EDUCATION/TRAINING PROGRAM | Admitting: STUDENT IN AN ORGANIZED HEALTH CARE EDUCATION/TRAINING PROGRAM
Payer: COMMERCIAL

## 2022-06-25 VITALS — HEIGHT: 64 IN | WEIGHT: 157.06 LBS | BODY MASS INDEX: 26.81 KG/M2

## 2022-06-25 VITALS — DIASTOLIC BLOOD PRESSURE: 79 MMHG | SYSTOLIC BLOOD PRESSURE: 153 MMHG

## 2022-06-25 VITALS — DIASTOLIC BLOOD PRESSURE: 75 MMHG

## 2022-06-25 VITALS — DIASTOLIC BLOOD PRESSURE: 95 MMHG

## 2022-06-25 DIAGNOSIS — I82.412: ICD-10-CM

## 2022-06-25 DIAGNOSIS — A41.9: Primary | ICD-10-CM

## 2022-06-25 DIAGNOSIS — E11.40: ICD-10-CM

## 2022-06-25 DIAGNOSIS — E83.42: ICD-10-CM

## 2022-06-25 DIAGNOSIS — L97.509: ICD-10-CM

## 2022-06-25 DIAGNOSIS — E11.621: ICD-10-CM

## 2022-06-25 DIAGNOSIS — B96.20: ICD-10-CM

## 2022-06-25 DIAGNOSIS — Z96.653: ICD-10-CM

## 2022-06-25 DIAGNOSIS — Z79.4: ICD-10-CM

## 2022-06-25 DIAGNOSIS — Z96.643: ICD-10-CM

## 2022-06-25 DIAGNOSIS — E11.51: ICD-10-CM

## 2022-06-25 DIAGNOSIS — L03.116: ICD-10-CM

## 2022-06-25 DIAGNOSIS — N39.0: ICD-10-CM

## 2022-06-25 DIAGNOSIS — Z88.0: ICD-10-CM

## 2022-06-25 DIAGNOSIS — E78.5: ICD-10-CM

## 2022-06-25 DIAGNOSIS — Z82.49: ICD-10-CM

## 2022-06-25 DIAGNOSIS — Z90.49: ICD-10-CM

## 2022-06-25 DIAGNOSIS — Z90.710: ICD-10-CM

## 2022-06-25 DIAGNOSIS — G24.01: ICD-10-CM

## 2022-06-25 DIAGNOSIS — Z80.8: ICD-10-CM

## 2022-06-25 DIAGNOSIS — Z88.1: ICD-10-CM

## 2022-06-25 DIAGNOSIS — I95.1: ICD-10-CM

## 2022-06-25 DIAGNOSIS — E87.1: ICD-10-CM

## 2022-06-25 DIAGNOSIS — M06.9: ICD-10-CM

## 2022-06-25 DIAGNOSIS — E03.9: ICD-10-CM

## 2022-06-25 LAB
ALP SERPL-CCNC: 83 U/L (ref 45–117)
ALT SERPL W P-5'-P-CCNC: 16 U/L (ref 12–78)
APTT PPP: 28.4 SECONDS (ref 20–32.1)
AST SERPL-CCNC: 13 IU/L (ref 3–35)
BACTERIA #/AREA URNS HPF: (no result) /[HPF]
BASOPHILS # BLD AUTO: 0 10*3/UL (ref 0–0.1)
BASOPHILS NFR BLD AUTO: 0.3 % (ref 0–1)
BUN SERPL-MCNC: 12 MG/DL (ref 7–24)
CHLORIDE SERPL-SCNC: 102 MMOL/L (ref 98–107)
CREAT SERPL-MCNC: 0.74 MG/DL (ref 0.55–1.02)
EOSINOPHIL # BLD AUTO: 0.1 10*3/UL (ref 0–0.4)
EOSINOPHIL # BLD AUTO: 1.5 % (ref 1–4)
EPI CELLS #/AREA URNS HPF: (no result) /[HPF]
ERYTHROCYTE [DISTWIDTH] IN BLOOD BY AUTOMATED COUNT: 13.6 % (ref 0–14.5)
GLUCOSE UR QL: (no result)
HCT VFR BLD AUTO: 38.3 % (ref 37–47)
INR BLD: 1.1 (ref 2–3.5)
LEUKOCYTE ESTERASE UR QL STRIP: (no result)
LYMPHOCYTES # BLD AUTO: 1.2 10*3/UL (ref 1.3–4.4)
LYMPHOCYTES NFR BLD AUTO: 13.4 % (ref 27–41)
MCH RBC QN AUTO: 28.7 PG (ref 27–31)
MCHC RBC AUTO-ENTMCNC: 32.6 G/DL (ref 33–37)
MCV RBC AUTO: 88 FL (ref 81–99)
MONOCYTES # BLD AUTO: 0.8 10*3/UL (ref 0.1–1)
MONOCYTES NFR BLD MANUAL: 9 % (ref 3–9)
NEUT #: 6.9 10*3/UL (ref 2.3–7.9)
NEUT %: 75.6 % (ref 47–73)
NRBC BLD QL AUTO: 0 10*3/UL (ref 0–0)
PH UR STRIP: 5.5 [PH] (ref 4.5–8)
PLATELET # BLD AUTO: 239 10*3/UL (ref 130–400)
PMV BLD AUTO: 9.2 FL (ref 9.6–12.3)
POTASSIUM SERPL-SCNC: 3.8 MMOL/L (ref 3.5–5.1)
PROT SERPL-MCNC: 7 GM/DL (ref 6.4–8.2)
RBC # BLD AUTO: 4.35 10*6/UL (ref 4.1–5.1)
RBC #/AREA URNS HPF: (no result) RBC/HPF (ref 0–2)
SODIUM SERPL-SCNC: 135 MMOL/L (ref 136–145)
SP GR UR: 1.01 (ref 1–1.03)
UROBILINOGEN UR STRIP-MCNC: 0.2 E.U./DL (ref 0–1)
WBC #/AREA URNS HPF: (no result) WBC/HPF (ref 0–5)
WBC NRBC COR # BLD AUTO: 9.2 10*3/UL (ref 4.8–10.8)

## 2022-06-26 VITALS — SYSTOLIC BLOOD PRESSURE: 119 MMHG | DIASTOLIC BLOOD PRESSURE: 72 MMHG

## 2022-06-26 VITALS — SYSTOLIC BLOOD PRESSURE: 159 MMHG | DIASTOLIC BLOOD PRESSURE: 82 MMHG

## 2022-06-26 VITALS — DIASTOLIC BLOOD PRESSURE: 73 MMHG

## 2022-06-26 VITALS — DIASTOLIC BLOOD PRESSURE: 59 MMHG

## 2022-06-26 VITALS — DIASTOLIC BLOOD PRESSURE: 68 MMHG

## 2022-06-26 LAB
ALP SERPL-CCNC: 72 U/L (ref 45–117)
ALT SERPL W P-5'-P-CCNC: 15 U/L (ref 12–78)
AST SERPL-CCNC: 18 IU/L (ref 3–35)
BASOPHILS # BLD AUTO: 0 10*3/UL (ref 0–0.1)
BASOPHILS NFR BLD AUTO: 0.3 % (ref 0–1)
BUN SERPL-MCNC: 10 MG/DL (ref 7–24)
CHLORIDE SERPL-SCNC: 104 MMOL/L (ref 98–107)
CREAT SERPL-MCNC: 0.6 MG/DL (ref 0.55–1.02)
EOSINOPHIL # BLD AUTO: 0.2 10*3/UL (ref 0–0.4)
EOSINOPHIL # BLD AUTO: 1.7 % (ref 1–4)
ERYTHROCYTE [DISTWIDTH] IN BLOOD BY AUTOMATED COUNT: 13.6 % (ref 0–14.5)
HCT VFR BLD AUTO: 34.7 % (ref 37–47)
LYMPHOCYTES # BLD AUTO: 1.6 10*3/UL (ref 1.3–4.4)
LYMPHOCYTES NFR BLD AUTO: 18.2 % (ref 27–41)
MCH RBC QN AUTO: 28.9 PG (ref 27–31)
MCHC RBC AUTO-ENTMCNC: 32.6 G/DL (ref 33–37)
MCV RBC AUTO: 88.7 FL (ref 81–99)
MONOCYTES # BLD AUTO: 0.8 10*3/UL (ref 0.1–1)
MONOCYTES NFR BLD MANUAL: 9.6 % (ref 3–9)
NEUT #: 6.1 10*3/UL (ref 2.3–7.9)
NEUT %: 69.7 % (ref 47–73)
NRBC BLD QL AUTO: 0 10*3/UL (ref 0–0)
PLATELET # BLD AUTO: 252 10*3/UL (ref 130–400)
PMV BLD AUTO: 9.6 FL (ref 9.6–12.3)
POTASSIUM SERPL-SCNC: 3.7 MMOL/L (ref 3.5–5.1)
PROT SERPL-MCNC: 5.9 GM/DL (ref 6.4–8.2)
RBC # BLD AUTO: 3.91 10*6/UL (ref 4.1–5.1)
SODIUM SERPL-SCNC: 137 MMOL/L (ref 136–145)
WBC NRBC COR # BLD AUTO: 8.8 10*3/UL (ref 4.8–10.8)

## 2022-06-27 VITALS — DIASTOLIC BLOOD PRESSURE: 78 MMHG | SYSTOLIC BLOOD PRESSURE: 102 MMHG

## 2022-06-27 VITALS — DIASTOLIC BLOOD PRESSURE: 69 MMHG

## 2022-06-27 VITALS — DIASTOLIC BLOOD PRESSURE: 62 MMHG

## 2022-06-27 VITALS — DIASTOLIC BLOOD PRESSURE: 80 MMHG

## 2022-06-27 VITALS — DIASTOLIC BLOOD PRESSURE: 73 MMHG

## 2022-06-27 LAB
BASOPHILS # BLD AUTO: 0 10*3/UL (ref 0–0.1)
BASOPHILS NFR BLD AUTO: 0.6 % (ref 0–1)
BUN SERPL-MCNC: 11 MG/DL (ref 7–24)
CHLORIDE SERPL-SCNC: 103 MMOL/L (ref 98–107)
CREAT SERPL-MCNC: 0.54 MG/DL (ref 0.55–1.02)
EOSINOPHIL # BLD AUTO: 0.2 10*3/UL (ref 0–0.4)
EOSINOPHIL # BLD AUTO: 2.9 % (ref 1–4)
ERYTHROCYTE [DISTWIDTH] IN BLOOD BY AUTOMATED COUNT: 13.3 % (ref 0–14.5)
HCT VFR BLD AUTO: 33.8 % (ref 37–47)
LYMPHOCYTES # BLD AUTO: 1.6 10*3/UL (ref 1.3–4.4)
LYMPHOCYTES NFR BLD AUTO: 21.8 % (ref 27–41)
MCH RBC QN AUTO: 29.1 PG (ref 27–31)
MCHC RBC AUTO-ENTMCNC: 32.8 G/DL (ref 33–37)
MCV RBC AUTO: 88.7 FL (ref 81–99)
MONOCYTES # BLD AUTO: 0.8 10*3/UL (ref 0.1–1)
MONOCYTES NFR BLD MANUAL: 11.1 % (ref 3–9)
NEUT #: 4.5 10*3/UL (ref 2.3–7.9)
NEUT %: 63.2 % (ref 47–73)
NRBC BLD QL AUTO: 0 10*3/UL (ref 0–0)
PLATELET # BLD AUTO: 218 10*3/UL (ref 130–400)
PMV BLD AUTO: 9.4 FL (ref 9.6–12.3)
POTASSIUM SERPL-SCNC: 4.2 MMOL/L (ref 3.5–5.1)
RBC # BLD AUTO: 3.81 10*6/UL (ref 4.1–5.1)
SODIUM SERPL-SCNC: 136 MMOL/L (ref 136–145)
WBC NRBC COR # BLD AUTO: 7.2 10*3/UL (ref 4.8–10.8)

## 2022-06-28 VITALS — DIASTOLIC BLOOD PRESSURE: 88 MMHG | SYSTOLIC BLOOD PRESSURE: 111 MMHG

## 2022-06-28 VITALS — DIASTOLIC BLOOD PRESSURE: 90 MMHG | SYSTOLIC BLOOD PRESSURE: 118 MMHG

## 2022-06-28 VITALS — DIASTOLIC BLOOD PRESSURE: 84 MMHG | SYSTOLIC BLOOD PRESSURE: 123 MMHG

## 2022-06-28 VITALS — DIASTOLIC BLOOD PRESSURE: 95 MMHG

## 2022-06-28 VITALS — DIASTOLIC BLOOD PRESSURE: 67 MMHG | SYSTOLIC BLOOD PRESSURE: 116 MMHG

## 2022-06-28 LAB
BASOPHILS # BLD AUTO: 0.1 10*3/UL (ref 0–0.1)
BASOPHILS NFR BLD AUTO: 0.8 % (ref 0–1)
BUN SERPL-MCNC: 12 MG/DL (ref 7–24)
CHLORIDE SERPL-SCNC: 103 MMOL/L (ref 98–107)
CREAT SERPL-MCNC: 0.61 MG/DL (ref 0.55–1.02)
EOSINOPHIL # BLD AUTO: 0.2 10*3/UL (ref 0–0.4)
EOSINOPHIL # BLD AUTO: 2.6 % (ref 1–4)
ERYTHROCYTE [DISTWIDTH] IN BLOOD BY AUTOMATED COUNT: 13.1 % (ref 0–14.5)
HCT VFR BLD AUTO: 35.2 % (ref 37–47)
LYMPHOCYTES # BLD AUTO: 1.6 10*3/UL (ref 1.3–4.4)
LYMPHOCYTES NFR BLD AUTO: 21.2 % (ref 27–41)
MCH RBC QN AUTO: 28.9 PG (ref 27–31)
MCHC RBC AUTO-ENTMCNC: 32.7 G/DL (ref 33–37)
MCV RBC AUTO: 88.4 FL (ref 81–99)
MONOCYTES # BLD AUTO: 0.7 10*3/UL (ref 0.1–1)
MONOCYTES NFR BLD MANUAL: 9.4 % (ref 3–9)
NEUT #: 4.8 10*3/UL (ref 2.3–7.9)
NEUT %: 65.6 % (ref 47–73)
NRBC BLD QL AUTO: 0 10*3/UL (ref 0–0)
PLATELET # BLD AUTO: 279 10*3/UL (ref 130–400)
PMV BLD AUTO: 9.4 FL (ref 9.6–12.3)
POTASSIUM SERPL-SCNC: 4 MMOL/L (ref 3.5–5.1)
RBC # BLD AUTO: 3.98 10*6/UL (ref 4.1–5.1)
SODIUM SERPL-SCNC: 136 MMOL/L (ref 136–145)
WBC NRBC COR # BLD AUTO: 7.4 10*3/UL (ref 4.8–10.8)

## 2022-06-29 VITALS — SYSTOLIC BLOOD PRESSURE: 148 MMHG | DIASTOLIC BLOOD PRESSURE: 72 MMHG

## 2022-06-29 VITALS — DIASTOLIC BLOOD PRESSURE: 52 MMHG

## 2022-06-29 VITALS — SYSTOLIC BLOOD PRESSURE: 118 MMHG | DIASTOLIC BLOOD PRESSURE: 69 MMHG

## 2022-06-29 VITALS — DIASTOLIC BLOOD PRESSURE: 67 MMHG

## 2022-06-29 VITALS — DIASTOLIC BLOOD PRESSURE: 66 MMHG

## 2022-06-30 VITALS — DIASTOLIC BLOOD PRESSURE: 50 MMHG | SYSTOLIC BLOOD PRESSURE: 134 MMHG

## 2022-06-30 VITALS — DIASTOLIC BLOOD PRESSURE: 71 MMHG

## 2022-06-30 VITALS — SYSTOLIC BLOOD PRESSURE: 136 MMHG | DIASTOLIC BLOOD PRESSURE: 54 MMHG

## 2022-06-30 VITALS — DIASTOLIC BLOOD PRESSURE: 50 MMHG | SYSTOLIC BLOOD PRESSURE: 126 MMHG

## 2022-06-30 VITALS — DIASTOLIC BLOOD PRESSURE: 73 MMHG

## 2022-06-30 LAB
BASOPHILS # BLD AUTO: 0.1 10*3/UL (ref 0–0.1)
BASOPHILS NFR BLD AUTO: 0.8 % (ref 0–1)
EOSINOPHIL # BLD AUTO: 0.2 10*3/UL (ref 0–0.4)
EOSINOPHIL # BLD AUTO: 2.7 % (ref 1–4)
ERYTHROCYTE [DISTWIDTH] IN BLOOD BY AUTOMATED COUNT: 13.2 % (ref 0–14.5)
HCT VFR BLD AUTO: 30.6 % (ref 37–47)
LYMPHOCYTES # BLD AUTO: 1.4 10*3/UL (ref 1.3–4.4)
LYMPHOCYTES NFR BLD AUTO: 22.5 % (ref 27–41)
MCH RBC QN AUTO: 28.5 PG (ref 27–31)
MCHC RBC AUTO-ENTMCNC: 32 G/DL (ref 33–37)
MCV RBC AUTO: 89 FL (ref 81–99)
MONOCYTES # BLD AUTO: 0.7 10*3/UL (ref 0.1–1)
MONOCYTES NFR BLD MANUAL: 11 % (ref 3–9)
NEUT #: 3.9 10*3/UL (ref 2.3–7.9)
NEUT %: 62.5 % (ref 47–73)
NRBC BLD QL AUTO: 0 % (ref 0–0)
PLATELET # BLD AUTO: 268 10*3/UL (ref 130–400)
PMV BLD AUTO: 9.1 FL (ref 9.6–12.3)
RBC # BLD AUTO: 3.44 10*6/UL (ref 4.1–5.1)
WBC NRBC COR # BLD AUTO: 6.3 10*3/UL (ref 4.8–10.8)

## 2022-07-01 VITALS — DIASTOLIC BLOOD PRESSURE: 61 MMHG

## 2022-07-01 VITALS — SYSTOLIC BLOOD PRESSURE: 141 MMHG | DIASTOLIC BLOOD PRESSURE: 72 MMHG

## 2022-07-01 VITALS — DIASTOLIC BLOOD PRESSURE: 64 MMHG

## 2022-07-01 LAB
BASOPHILS # BLD AUTO: 0.1 10*3/UL (ref 0–0.1)
BASOPHILS NFR BLD AUTO: 0.8 % (ref 0–1)
BUN SERPL-MCNC: 10 MG/DL (ref 7–24)
CHLORIDE SERPL-SCNC: 107 MMOL/L (ref 98–107)
CREAT SERPL-MCNC: 0.53 MG/DL (ref 0.55–1.02)
EOSINOPHIL # BLD AUTO: 0.2 10*3/UL (ref 0–0.4)
EOSINOPHIL # BLD AUTO: 3.4 % (ref 1–4)
ERYTHROCYTE [DISTWIDTH] IN BLOOD BY AUTOMATED COUNT: 13.1 % (ref 0–14.5)
HCT VFR BLD AUTO: 32.2 % (ref 37–47)
LYMPHOCYTES # BLD AUTO: 1.4 10*3/UL (ref 1.3–4.4)
LYMPHOCYTES NFR BLD AUTO: 22.7 % (ref 27–41)
MCH RBC QN AUTO: 29.1 PG (ref 27–31)
MCHC RBC AUTO-ENTMCNC: 32.3 G/DL (ref 33–37)
MCV RBC AUTO: 89.9 FL (ref 81–99)
MONOCYTES # BLD AUTO: 0.7 10*3/UL (ref 0.1–1)
MONOCYTES NFR BLD MANUAL: 11.3 % (ref 3–9)
NEUT #: 3.8 10*3/UL (ref 2.3–7.9)
NEUT %: 61.1 % (ref 47–73)
NRBC BLD QL AUTO: 0 10*3/UL (ref 0–0)
PLATELET # BLD AUTO: 272 10*3/UL (ref 130–400)
PMV BLD AUTO: 9.3 FL (ref 9.6–12.3)
POTASSIUM SERPL-SCNC: 3.9 MMOL/L (ref 3.5–5.1)
RBC # BLD AUTO: 3.58 10*6/UL (ref 4.1–5.1)
SODIUM SERPL-SCNC: 141 MMOL/L (ref 136–145)
WBC NRBC COR # BLD AUTO: 6.1 10*3/UL (ref 4.8–10.8)

## 2022-07-03 ENCOUNTER — HOSPITAL ENCOUNTER (EMERGENCY)
Dept: HOSPITAL 83 - ED | Age: 67
Discharge: HOME | End: 2022-07-03
Payer: COMMERCIAL

## 2022-07-03 VITALS — HEIGHT: 55 IN | WEIGHT: 157 LBS

## 2022-07-03 VITALS — DIASTOLIC BLOOD PRESSURE: 79 MMHG

## 2022-07-03 DIAGNOSIS — Z90.49: ICD-10-CM

## 2022-07-03 DIAGNOSIS — Z90.710: ICD-10-CM

## 2022-07-03 DIAGNOSIS — Z98.890: ICD-10-CM

## 2022-07-03 DIAGNOSIS — I96: Primary | ICD-10-CM

## 2022-07-03 DIAGNOSIS — Z79.899: ICD-10-CM

## 2022-07-03 LAB
ALP SERPL-CCNC: 67 U/L (ref 45–117)
ALT SERPL W P-5'-P-CCNC: 14 U/L (ref 12–78)
AST SERPL-CCNC: 20 IU/L (ref 3–35)
BASOPHILS # BLD AUTO: 0.1 10*3/UL (ref 0–0.1)
BASOPHILS NFR BLD AUTO: 1.4 % (ref 0–1)
BUN SERPL-MCNC: 13 MG/DL (ref 7–24)
CHLORIDE SERPL-SCNC: 107 MMOL/L (ref 98–107)
CREAT SERPL-MCNC: 0.68 MG/DL (ref 0.55–1.02)
EOSINOPHIL # BLD AUTO: 0.2 10*3/UL (ref 0–0.4)
EOSINOPHIL # BLD AUTO: 3.7 % (ref 1–4)
ERYTHROCYTE [DISTWIDTH] IN BLOOD BY AUTOMATED COUNT: 13.3 % (ref 0–14.5)
HCT VFR BLD AUTO: 37.7 % (ref 37–47)
LYMPHOCYTES # BLD AUTO: 1.7 10*3/UL (ref 1.3–4.4)
LYMPHOCYTES NFR BLD AUTO: 30.5 % (ref 27–41)
MCH RBC QN AUTO: 28.5 PG (ref 27–31)
MCHC RBC AUTO-ENTMCNC: 30.8 G/DL (ref 33–37)
MCV RBC AUTO: 92.6 FL (ref 81–99)
MONOCYTES # BLD AUTO: 0.7 10*3/UL (ref 0.1–1)
MONOCYTES NFR BLD MANUAL: 12.2 % (ref 3–9)
NEUT #: 2.9 10*3/UL (ref 2.3–7.9)
NEUT %: 51.3 % (ref 47–73)
NRBC BLD QL AUTO: 0 10*3/UL (ref 0–0)
PLATELET # BLD AUTO: 324 10*3/UL (ref 130–400)
PMV BLD AUTO: 8.7 FL (ref 9.6–12.3)
POTASSIUM SERPL-SCNC: 3.8 MMOL/L (ref 3.5–5.1)
PROT SERPL-MCNC: 6.4 GM/DL (ref 6.4–8.2)
RBC # BLD AUTO: 4.07 10*6/UL (ref 4.1–5.1)
SODIUM SERPL-SCNC: 139 MMOL/L (ref 136–145)
WBC NRBC COR # BLD AUTO: 5.7 10*3/UL (ref 4.8–10.8)

## 2022-07-07 ENCOUNTER — TELEPHONE (OUTPATIENT)
Dept: VASCULAR SURGERY | Age: 67
End: 2022-07-07

## 2022-07-07 NOTE — TELEPHONE ENCOUNTER
Spoke with Melba Williamson at Dr. Jose D Lopes office, she will fax test results and referral for this patient in order to schedule appointment.

## 2022-07-09 ENCOUNTER — HOSPITAL ENCOUNTER (OUTPATIENT)
Dept: HOSPITAL 83 - LAB | Age: 67
Discharge: HOME | End: 2022-07-09
Attending: STUDENT IN AN ORGANIZED HEALTH CARE EDUCATION/TRAINING PROGRAM
Payer: COMMERCIAL

## 2022-07-09 DIAGNOSIS — R19.7: Primary | ICD-10-CM

## 2022-07-09 LAB
BASOPHILS # BLD AUTO: 0.1 10*3/UL (ref 0–0.1)
BASOPHILS NFR BLD AUTO: 0.9 % (ref 0–1)
BUN SERPL-MCNC: 16 MG/DL (ref 7–24)
CHLORIDE SERPL-SCNC: 107 MMOL/L (ref 98–107)
CREAT SERPL-MCNC: 0.75 MG/DL (ref 0.55–1.02)
EOSINOPHIL # BLD AUTO: 0.3 10*3/UL (ref 0–0.4)
EOSINOPHIL # BLD AUTO: 2.9 % (ref 1–4)
ERYTHROCYTE [DISTWIDTH] IN BLOOD BY AUTOMATED COUNT: 13.7 % (ref 0–14.5)
HCT VFR BLD AUTO: 38.9 % (ref 37–47)
LYMPHOCYTES # BLD AUTO: 1.6 10*3/UL (ref 1.3–4.4)
LYMPHOCYTES NFR BLD AUTO: 17.5 % (ref 27–41)
MCH RBC QN AUTO: 28.3 PG (ref 27–31)
MCHC RBC AUTO-ENTMCNC: 31.9 G/DL (ref 33–37)
MCV RBC AUTO: 88.8 FL (ref 81–99)
MONOCYTES # BLD AUTO: 0.7 10*3/UL (ref 0.1–1)
MONOCYTES NFR BLD MANUAL: 7.9 % (ref 3–9)
NEUT #: 6.4 10*3/UL (ref 2.3–7.9)
NEUT %: 70.3 % (ref 47–73)
NRBC BLD QL AUTO: 0 % (ref 0–0)
PLATELET # BLD AUTO: 385 10*3/UL (ref 130–400)
PMV BLD AUTO: 8.8 FL (ref 9.6–12.3)
POTASSIUM SERPL-SCNC: 4.7 MMOL/L (ref 3.5–5.1)
RBC # BLD AUTO: 4.38 10*6/UL (ref 4.1–5.1)
SODIUM SERPL-SCNC: 139 MMOL/L (ref 136–145)
WBC NRBC COR # BLD AUTO: 9.1 10*3/UL (ref 4.8–10.8)

## 2022-07-11 ENCOUNTER — HOSPITAL ENCOUNTER (OUTPATIENT)
Dept: HOSPITAL 83 - WOUNDCARE | Age: 67
End: 2022-07-11
Attending: PODIATRIST
Payer: COMMERCIAL

## 2022-07-11 DIAGNOSIS — E11.621: Primary | ICD-10-CM

## 2022-07-11 DIAGNOSIS — E03.9: ICD-10-CM

## 2022-07-11 DIAGNOSIS — I10: ICD-10-CM

## 2022-07-11 DIAGNOSIS — M06.9: ICD-10-CM

## 2022-07-11 DIAGNOSIS — I96: ICD-10-CM

## 2022-07-11 DIAGNOSIS — E11.40: ICD-10-CM

## 2022-07-11 DIAGNOSIS — Z86.718: ICD-10-CM

## 2022-07-11 DIAGNOSIS — Z79.84: ICD-10-CM

## 2022-07-11 DIAGNOSIS — E78.5: ICD-10-CM

## 2022-07-11 DIAGNOSIS — L97.522: ICD-10-CM

## 2022-07-11 DIAGNOSIS — Z90.710: ICD-10-CM

## 2022-07-11 DIAGNOSIS — E11.52: ICD-10-CM

## 2022-07-11 DIAGNOSIS — Z90.49: ICD-10-CM

## 2022-07-18 ENCOUNTER — HOSPITAL ENCOUNTER (OUTPATIENT)
Dept: HOSPITAL 83 - WOUNDCARE | Age: 67
Discharge: HOME | End: 2022-07-18
Attending: PODIATRIST
Payer: COMMERCIAL

## 2022-07-18 DIAGNOSIS — Z90.710: ICD-10-CM

## 2022-07-18 DIAGNOSIS — I96: ICD-10-CM

## 2022-07-18 DIAGNOSIS — E11.621: Primary | ICD-10-CM

## 2022-07-18 DIAGNOSIS — E78.5: ICD-10-CM

## 2022-07-18 DIAGNOSIS — M06.9: ICD-10-CM

## 2022-07-18 DIAGNOSIS — E03.9: ICD-10-CM

## 2022-07-18 DIAGNOSIS — Z79.84: ICD-10-CM

## 2022-07-18 DIAGNOSIS — E11.52: ICD-10-CM

## 2022-07-18 DIAGNOSIS — I10: ICD-10-CM

## 2022-07-18 DIAGNOSIS — Z86.718: ICD-10-CM

## 2022-07-18 DIAGNOSIS — Z90.49: ICD-10-CM

## 2022-07-18 DIAGNOSIS — E11.40: ICD-10-CM

## 2022-07-18 DIAGNOSIS — L97.522: ICD-10-CM

## 2022-08-01 ENCOUNTER — HOSPITAL ENCOUNTER (OUTPATIENT)
Dept: HOSPITAL 83 - WOUNDCARE | Age: 67
Discharge: HOME | End: 2022-08-01
Payer: COMMERCIAL

## 2022-08-01 DIAGNOSIS — E78.5: ICD-10-CM

## 2022-08-01 DIAGNOSIS — I10: ICD-10-CM

## 2022-08-01 DIAGNOSIS — Z86.718: ICD-10-CM

## 2022-08-01 DIAGNOSIS — I96: ICD-10-CM

## 2022-08-01 DIAGNOSIS — E03.9: ICD-10-CM

## 2022-08-01 DIAGNOSIS — E11.40: ICD-10-CM

## 2022-08-01 DIAGNOSIS — E11.621: Primary | ICD-10-CM

## 2022-08-01 DIAGNOSIS — L97.522: ICD-10-CM

## 2022-08-01 DIAGNOSIS — Z90.49: ICD-10-CM

## 2022-08-01 DIAGNOSIS — M06.9: ICD-10-CM

## 2022-08-01 DIAGNOSIS — E11.52: ICD-10-CM

## 2022-08-01 DIAGNOSIS — Z90.710: ICD-10-CM

## 2022-08-08 ENCOUNTER — HOSPITAL ENCOUNTER (OUTPATIENT)
Dept: HOSPITAL 83 - WOUNDCARE | Age: 67
Discharge: HOME | End: 2022-08-08
Payer: COMMERCIAL

## 2022-08-08 DIAGNOSIS — E11.40: ICD-10-CM

## 2022-08-08 DIAGNOSIS — Z90.49: ICD-10-CM

## 2022-08-08 DIAGNOSIS — I10: ICD-10-CM

## 2022-08-08 DIAGNOSIS — I96: ICD-10-CM

## 2022-08-08 DIAGNOSIS — E78.5: ICD-10-CM

## 2022-08-08 DIAGNOSIS — M06.9: ICD-10-CM

## 2022-08-08 DIAGNOSIS — L97.522: ICD-10-CM

## 2022-08-08 DIAGNOSIS — E11.52: ICD-10-CM

## 2022-08-08 DIAGNOSIS — Z90.710: ICD-10-CM

## 2022-08-08 DIAGNOSIS — E03.9: ICD-10-CM

## 2022-08-08 DIAGNOSIS — Z86.718: ICD-10-CM

## 2022-08-08 DIAGNOSIS — E11.621: Primary | ICD-10-CM

## 2022-08-12 RX ORDER — INSULIN GLARGINE 100 [IU]/ML
100 INJECTION, SOLUTION SUBCUTANEOUS
COMMUNITY
Start: 2022-06-11

## 2022-08-12 RX ORDER — SULFASALAZINE 500 MG/1
500 TABLET ORAL 2 TIMES DAILY
COMMUNITY
Start: 2022-06-22

## 2022-08-12 RX ORDER — OMEPRAZOLE 40 MG/1
10 CAPSULE, DELAYED RELEASE ORAL
COMMUNITY
Start: 2022-07-05

## 2022-08-12 RX ORDER — GLYBURIDE 5 MG/1
5 TABLET ORAL DAILY
COMMUNITY
Start: 2022-07-14

## 2022-08-12 RX ORDER — OXYCODONE HYDROCHLORIDE 5 MG/1
5 TABLET ORAL 4 TIMES DAILY PRN
COMMUNITY
Start: 2022-07-12

## 2022-08-15 ENCOUNTER — HOSPITAL ENCOUNTER (OUTPATIENT)
Dept: HOSPITAL 83 - WOUNDCARE | Age: 67
Discharge: HOME | End: 2022-08-15
Payer: COMMERCIAL

## 2022-08-15 DIAGNOSIS — M06.9: ICD-10-CM

## 2022-08-15 DIAGNOSIS — L97.522: ICD-10-CM

## 2022-08-15 DIAGNOSIS — Z90.710: ICD-10-CM

## 2022-08-15 DIAGNOSIS — I96: ICD-10-CM

## 2022-08-15 DIAGNOSIS — I10: ICD-10-CM

## 2022-08-15 DIAGNOSIS — E11.621: Primary | ICD-10-CM

## 2022-08-15 DIAGNOSIS — E78.5: ICD-10-CM

## 2022-08-15 DIAGNOSIS — Z90.49: ICD-10-CM

## 2022-08-15 DIAGNOSIS — E11.52: ICD-10-CM

## 2022-08-15 DIAGNOSIS — E11.40: ICD-10-CM

## 2022-08-15 DIAGNOSIS — E03.9: ICD-10-CM

## 2022-08-15 DIAGNOSIS — Z86.718: ICD-10-CM

## 2022-08-15 DIAGNOSIS — M20.12: ICD-10-CM

## 2022-08-17 ENCOUNTER — TELEPHONE (OUTPATIENT)
Dept: VASCULAR SURGERY | Age: 67
End: 2022-08-17

## 2022-08-17 ENCOUNTER — OFFICE VISIT (OUTPATIENT)
Dept: VASCULAR SURGERY | Age: 67
End: 2022-08-17
Payer: COMMERCIAL

## 2022-08-17 VITALS
DIASTOLIC BLOOD PRESSURE: 86 MMHG | SYSTOLIC BLOOD PRESSURE: 132 MMHG | WEIGHT: 150 LBS | BODY MASS INDEX: 24.11 KG/M2 | HEIGHT: 66 IN

## 2022-08-17 DIAGNOSIS — I73.9 PERIPHERAL VASCULAR DISEASE OF LOWER EXTREMITY WITH ULCERATION (HCC): Primary | ICD-10-CM

## 2022-08-17 DIAGNOSIS — I73.9 PERIPHERAL VASCULAR DISEASE (HCC): Primary | ICD-10-CM

## 2022-08-17 DIAGNOSIS — L97.909 PERIPHERAL VASCULAR DISEASE OF LOWER EXTREMITY WITH ULCERATION (HCC): Primary | ICD-10-CM

## 2022-08-17 PROCEDURE — G8420 CALC BMI NORM PARAMETERS: HCPCS | Performed by: SURGERY

## 2022-08-17 PROCEDURE — 1123F ACP DISCUSS/DSCN MKR DOCD: CPT | Performed by: SURGERY

## 2022-08-17 PROCEDURE — 4004F PT TOBACCO SCREEN RCVD TLK: CPT | Performed by: SURGERY

## 2022-08-17 PROCEDURE — 1090F PRES/ABSN URINE INCON ASSESS: CPT | Performed by: SURGERY

## 2022-08-17 PROCEDURE — 99203 OFFICE O/P NEW LOW 30 MIN: CPT | Performed by: SURGERY

## 2022-08-17 PROCEDURE — G8400 PT W/DXA NO RESULTS DOC: HCPCS | Performed by: SURGERY

## 2022-08-17 PROCEDURE — 3017F COLORECTAL CA SCREEN DOC REV: CPT | Performed by: SURGERY

## 2022-08-17 PROCEDURE — G8427 DOCREV CUR MEDS BY ELIG CLIN: HCPCS | Performed by: SURGERY

## 2022-08-17 RX ORDER — M-VIT,TX,IRON,MINS/CALC/FOLIC 27MG-0.4MG
1 TABLET ORAL DAILY
COMMUNITY

## 2022-08-17 RX ORDER — CIPROFLOXACIN 250 MG/1
250 TABLET, FILM COATED ORAL 2 TIMES DAILY
COMMUNITY
End: 2022-11-02 | Stop reason: ALTCHOICE

## 2022-08-17 RX ORDER — PIOGLITAZONEHYDROCHLORIDE 15 MG/1
15 TABLET ORAL DAILY
COMMUNITY

## 2022-08-17 RX ORDER — PROPRANOLOL HYDROCHLORIDE 20 MG/1
20 TABLET ORAL 3 TIMES DAILY
COMMUNITY

## 2022-08-17 RX ORDER — MULTIVIT-MIN/IRON/FOLIC ACID/K 18-600-40
CAPSULE ORAL
COMMUNITY

## 2022-08-17 NOTE — PROGRESS NOTES
Vascular Surgery Outpatient Consultation        Reason for Consult:    Chief Complaint   Patient presents with    Consultation     New pt.bilateral lower extremities pain and swelling       Requesting Physician:  No referring provider defined for this encounter. Chief Complaint   Patient presents with    Consultation     New pt.bilateral lower extremities pain and swelling       HISTORY OF PRESENT ILLNESS:                The patient is a 79 y.o. female who is referred for evaluation of left second toe wound. She describes a left second toe wound for approximately 3 months now. She is been following with podiatry. They are planning a second toe amputation. She has been sent for further evaluation. She has a history of diabetes for 20 years. She is a non-smoker. She denies any chest pain shortness of breath or discomfort. She denies any worsening of her toe or other toes. She has a history of cellulitis in that lower extremity. She also has a history of a deep venous thrombosis which she currently is taking Xarelto. She denies any significant history of claudication. She denies any history of rest pain and this the first time she is ever had a wound. .    Past Medical History:        Diagnosis Date    Arthritis     DM (diabetes mellitus) ([facility] [address]     Hypothyroid      Past Surgical History:        Procedure Laterality Date    GALLBLADDER SURGERY      HIP SURGERY      HYSTERECTOMY (CERVIX STATUS UNKNOWN)      KNEE SURGERY       Current Medications:   Prior to Admission medications    Medication Sig Start Date End Date Taking?  Authorizing Provider   propranolol (INDERAL) 20 MG tablet Take 20 mg by mouth 3 times daily   Yes Historical Provider, MD   Multiple Vitamins-Minerals (THERAPEUTIC MULTIVITAMIN-MINERALS) tablet Take 1 tablet by mouth daily   Yes Historical Provider, MD   pioglitazone (ACTOS) 15 MG tablet Take 15 mg by mouth daily   Yes Historical Provider, MD   Cholecalciferol (VITAMIN D) 50 MCG (2000 UT) CAPS capsule Take by mouth   Yes Historical Provider, MD   ciprofloxacin (CIPRO) 250 MG tablet Take 250 mg by mouth 2 times daily   Yes Historical Provider, MD   oxyCODONE (ROXICODONE) 5 MG immediate release tablet Take 5 mg by mouth 4 times daily as needed. 7/12/22  Yes Historical Provider, MD   LANTUS 100 UNIT/ML injection vial Inject 100 Units into the skin once as needed 6/11/22  Yes Historical Provider, MD   glyBURIDE (DIABETA) 5 MG tablet Take 5 mg by mouth in the morning. 7/14/22  Yes Historical Provider, MD   metFORMIN (GLUCOPHAGE) 1000 MG tablet Take 1,000 mg by mouth 2 times daily (with meals) 5/12/22  Yes Historical Provider, MD   sulfaSALAzine (AZULFIDINE) 500 MG tablet Take 500 mg by mouth 2 times daily 6/22/22  Yes Historical Provider, MD   omeprazole (PRILOSEC) 40 MG delayed release capsule Take 10 mg by mouth 7/5/22  Yes Historical Provider, MD     Allergies:  Patient has no known allergies. Social History     Socioeconomic History    Marital status:      Spouse name: Not on file    Number of children: Not on file    Years of education: Not on file    Highest education level: Not on file   Occupational History    Not on file   Tobacco Use    Smoking status: Not on file    Smokeless tobacco: Never   Substance and Sexual Activity    Alcohol use: Never    Drug use: Never    Sexual activity: Not on file   Other Topics Concern    Not on file   Social History Narrative    Not on file     Social Determinants of Health     Financial Resource Strain: Not on file   Food Insecurity: Not on file   Transportation Needs: Not on file   Physical Activity: Not on file   Stress: Not on file   Social Connections: Not on file   Intimate Partner Violence: Not on file   Housing Stability: Not on file        No family history on file.     REVIEW OF SYSTEMS (New symptoms):    Eyes:      Blurred vision:  No [x]/Yes []               Diplopia:   No [x]/Yes []               Vision loss:       No [x]/Yes []   Ears, nose, throat:             Hearing loss:    No [x]/Yes []      Vertigo:   No [x]/Yes []                       Swallowing problem:  No [x]/Yes []               Nose bleeds:   No [x]/Yes []      Voice hoarseness:  No [x]/Yes []  Respiratory:             Cough:   No [x]/Yes []      Pleuritic chest pain:  No [x]/Yes []                        Dyspnea:   No [x]/Yes []      Wheezing:   No [x]/Yes []  Cardiovascular:             Angina:   No [x]/Yes []      Palpitations:   No [x]/Yes []          Claudication:    No [x]/Yes []      Leg swelling:   No [x]/Yes []  Gastrointestinal:             Nausea or vomiting:  No [x]/Yes []               Abdominal pain:  No [x]/Yes []                     Intestinal bleeding: No [x]/Yes []  Musculoskeletal:             Leg pain:   No [x]/Yes []      Back pain:   No [x]/Yes []                    Weakness:   No [x]/Yes []  Neurologic:             Numbness:   No [x]/Yes []      Paralysis:   No [x]/Yes []                       Headaches:   No [x]/Yes []  Hematologic, lymphatic:   Anemia:   No [x]/Yes []              Bleeding or bruising:  No [x]/Yes []              Fevers or chills: No [x]/Yes []  Endocrine:             Temp intolerance:   No [x]/Yes []                       Polydipsia, polyuria:  No [x]/Yes []  Skin:              Rash:    No [x]/Yes []      Ulcers:   No [x]/Yes []              Abnorm pigment: No [x]/Yes []  :              Frequency/urgency:  No [x]/Yes []      Hematuria:    No [x]/Yes []                      Incontinence:    No [x]/Yes []  Vascular: See HPI musculoskeletal see HPI  PHYSICAL EXAM:  Vitals:    08/17/22 0941   BP: 132/86     General Appearance: alert and oriented to person, place and time, well developed and well- nourished, in no acute distress  Skin: warm and dry, no rash or erythema, dry eschars noted of the second toe  Head: normocephalic and atraumatic  Eyes: extraocular eye movements intact, conjunctivae normal  ENT: external ear and ear canal normal bilaterally, nose without deformity  Pulmonary/Chest: clear to auscultation bilaterally- no wheezes, rales or rhonchi, normal air movement, no respiratory distress  Cardiovascular: normal rate, regular rhythm, normal S1 and S2, no murmurs, no carotid bruits  Abdomen: soft, non-tender, non-distended, normal bowel sounds, no masses or organomegaly  Musculoskeletal: normal range of motion, no joint swelling, deformity or tenderness  Neurologic: no cranial nerve deficit, gait, coordination and speech normal  Extremities: Bilateral palpable brachial radial pulses femorals are palpable. Popliteals are difficult to appreciate. On the right she has a multiphasic right dorsalis pedis. The posterior tibials difficult to appreciate. The left posterior tibials multiphasic in the dorsalis pedis is difficult to appreciate. I can get a signal further down but that is more consistent with an arch signal.    Problem List Items Addressed This Visit       Peripheral vascular disease of lower extremity with ulceration ([facility] [address] - Primary         #1 left lower extremity second toe ulcer/eschar/dry gangrene. From our standpoint we can order baseline ankle-brachial indices. She may require an arteriogram depending on what those illustrate. She will call for the test results. On the physical examination they sounded multiphasic whereas on the Doppler study that was reported as monophasic I think she will need an arteriogram I looked at her arterial studies she has monophasic waveforms. She has had an arterial study. Demonstrating similar findings consistent with my examination. She is to call if any worsening or report to the emergency room    [address]      Return in about 1 month (around 9/17/2022).

## 2022-08-17 NOTE — TELEPHONE ENCOUNTER
Notified patient of PASCUAL at [facility] on Friday, 9-9-22, at 8:00 am. [place] at 7:30 am.  Angiogram on Monday, 9-12-22. Report to admitting at 6:15 am. Instructions given.

## 2022-08-21 ENCOUNTER — HOSPITAL ENCOUNTER (EMERGENCY)
Dept: HOSPITAL 83 - ED | Age: 67
Discharge: HOME | End: 2022-08-21
Payer: COMMERCIAL

## 2022-08-21 VITALS — DIASTOLIC BLOOD PRESSURE: 76 MMHG

## 2022-08-21 VITALS — HEIGHT: 55 IN

## 2022-08-21 DIAGNOSIS — Z79.4: ICD-10-CM

## 2022-08-21 DIAGNOSIS — E11.9: ICD-10-CM

## 2022-08-21 DIAGNOSIS — M79.7: ICD-10-CM

## 2022-08-21 DIAGNOSIS — N39.0: Primary | ICD-10-CM

## 2022-08-21 DIAGNOSIS — Z88.1: ICD-10-CM

## 2022-08-21 DIAGNOSIS — M06.9: ICD-10-CM

## 2022-08-21 DIAGNOSIS — Z96.653: ICD-10-CM

## 2022-08-21 DIAGNOSIS — Z90.49: ICD-10-CM

## 2022-08-21 DIAGNOSIS — Z90.710: ICD-10-CM

## 2022-08-21 DIAGNOSIS — I10: ICD-10-CM

## 2022-08-21 DIAGNOSIS — Z88.0: ICD-10-CM

## 2022-08-21 DIAGNOSIS — F41.9: ICD-10-CM

## 2022-08-21 DIAGNOSIS — Z96.643: ICD-10-CM

## 2022-08-21 DIAGNOSIS — Z79.899: ICD-10-CM

## 2022-08-21 DIAGNOSIS — E03.9: ICD-10-CM

## 2022-08-21 DIAGNOSIS — Z86.718: ICD-10-CM

## 2022-08-21 LAB
ALP SERPL-CCNC: 92 U/L (ref 45–117)
ALT SERPL W P-5'-P-CCNC: 16 U/L (ref 12–78)
AST SERPL-CCNC: 15 IU/L (ref 3–35)
BASOPHILS # BLD AUTO: 0.1 10*3/UL (ref 0–0.1)
BASOPHILS NFR BLD AUTO: 0.8 % (ref 0–1)
BUN SERPL-MCNC: 20 MG/DL (ref 7–24)
CHLORIDE SERPL-SCNC: 105 MMOL/L (ref 98–107)
CREAT SERPL-MCNC: 0.86 MG/DL (ref 0.55–1.02)
EOSINOPHIL # BLD AUTO: 0.2 10*3/UL (ref 0–0.4)
EOSINOPHIL # BLD AUTO: 3.1 % (ref 1–4)
ERYTHROCYTE [DISTWIDTH] IN BLOOD BY AUTOMATED COUNT: 13.6 % (ref 0–14.5)
GLUCOSE UR QL: (no result)
HCT VFR BLD AUTO: 37.8 % (ref 37–47)
HGB UR QL STRIP: (no result)
LEUKOCYTE ESTERASE UR QL STRIP: (no result)
LYMPHOCYTES # BLD AUTO: 1.7 10*3/UL (ref 1.3–4.4)
LYMPHOCYTES NFR BLD AUTO: 23.4 % (ref 27–41)
MCH RBC QN AUTO: 28 PG (ref 27–31)
MCHC RBC AUTO-ENTMCNC: 33.1 G/DL (ref 33–37)
MCV RBC AUTO: 84.8 FL (ref 81–99)
MONOCYTES # BLD AUTO: 0.8 10*3/UL (ref 0.1–1)
MONOCYTES NFR BLD MANUAL: 11.6 % (ref 3–9)
NEUT #: 4.4 10*3/UL (ref 2.3–7.9)
NEUT %: 60.7 % (ref 47–73)
NRBC BLD QL AUTO: 0 % (ref 0–0)
PH UR STRIP: 6 [PH] (ref 4.5–8)
PLATELET # BLD AUTO: 309 10*3/UL (ref 130–400)
PMV BLD AUTO: 8.6 FL (ref 9.6–12.3)
POTASSIUM SERPL-SCNC: 3.5 MMOL/L (ref 3.5–5.1)
PROT SERPL-MCNC: 7.3 GM/DL (ref 6.4–8.2)
RBC # BLD AUTO: 4.46 10*6/UL (ref 4.1–5.1)
SODIUM SERPL-SCNC: 138 MMOL/L (ref 136–145)
SP GR UR: 1.01 (ref 1–1.03)
UROBILINOGEN UR STRIP-MCNC: 0.2 E.U./DL (ref 0–1)
WBC #/AREA URNS HPF: (no result) WBC/HPF (ref 0–5)
WBC NRBC COR # BLD AUTO: 7.2 10*3/UL (ref 4.8–10.8)

## 2022-08-22 ENCOUNTER — HOSPITAL ENCOUNTER (OUTPATIENT)
Dept: HOSPITAL 83 - WOUNDCARE | Age: 67
End: 2022-08-22
Payer: COMMERCIAL

## 2022-08-22 DIAGNOSIS — E11.621: Primary | ICD-10-CM

## 2022-08-22 DIAGNOSIS — E78.5: ICD-10-CM

## 2022-08-22 DIAGNOSIS — I10: ICD-10-CM

## 2022-08-22 DIAGNOSIS — E11.52: ICD-10-CM

## 2022-08-22 DIAGNOSIS — M06.9: ICD-10-CM

## 2022-08-22 DIAGNOSIS — Z90.49: ICD-10-CM

## 2022-08-22 DIAGNOSIS — I96: ICD-10-CM

## 2022-08-22 DIAGNOSIS — Z86.718: ICD-10-CM

## 2022-08-22 DIAGNOSIS — E03.9: ICD-10-CM

## 2022-08-22 DIAGNOSIS — Z90.710: ICD-10-CM

## 2022-08-22 DIAGNOSIS — L97.522: ICD-10-CM

## 2022-08-22 DIAGNOSIS — M20.12: ICD-10-CM

## 2022-08-22 DIAGNOSIS — E11.40: ICD-10-CM

## 2022-08-29 ENCOUNTER — HOSPITAL ENCOUNTER (OUTPATIENT)
Dept: HOSPITAL 83 - WOUNDCARE | Age: 67
Discharge: HOME | End: 2022-08-29
Payer: COMMERCIAL

## 2022-08-29 DIAGNOSIS — Z90.710: ICD-10-CM

## 2022-08-29 DIAGNOSIS — L97.522: ICD-10-CM

## 2022-08-29 DIAGNOSIS — E11.621: Primary | ICD-10-CM

## 2022-08-29 DIAGNOSIS — M20.12: ICD-10-CM

## 2022-08-29 DIAGNOSIS — E03.9: ICD-10-CM

## 2022-08-29 DIAGNOSIS — Z86.718: ICD-10-CM

## 2022-08-29 DIAGNOSIS — Z90.49: ICD-10-CM

## 2022-08-29 DIAGNOSIS — E78.5: ICD-10-CM

## 2022-08-29 DIAGNOSIS — E11.40: ICD-10-CM

## 2022-08-29 DIAGNOSIS — I96: ICD-10-CM

## 2022-08-29 DIAGNOSIS — M06.9: ICD-10-CM

## 2022-08-29 DIAGNOSIS — E11.52: ICD-10-CM

## 2022-08-29 DIAGNOSIS — I10: ICD-10-CM

## 2022-08-30 ENCOUNTER — TELEPHONE (OUTPATIENT)
Dept: VASCULAR SURGERY | Age: 67
End: 2022-08-30

## 2022-08-30 NOTE — TELEPHONE ENCOUNTER
Rescheduled 9/12/22 angiogram with Dr. Mcgrath Breaker to 9/19, message left on pt's answering machine.

## 2022-09-02 ENCOUNTER — HOSPITAL ENCOUNTER (OUTPATIENT)
Dept: INTERVENTIONAL RADIOLOGY/VASCULAR | Age: 67
Discharge: HOME | End: 2022-09-04
Payer: COMMERCIAL

## 2022-09-02 DIAGNOSIS — I73.9 PERIPHERAL VASCULAR DISEASE (HCC): ICD-10-CM

## 2022-09-02 PROCEDURE — 93922 UPR/L XTREMITY ART 2 LEVELS: CPT

## 2022-09-07 ENCOUNTER — HOSPITAL ENCOUNTER (EMERGENCY)
Dept: HOSPITAL 83 - ED | Age: 67
Discharge: LEFT BEFORE DISCHARGE | End: 2022-09-07
Payer: COMMERCIAL

## 2022-09-07 VITALS — SYSTOLIC BLOOD PRESSURE: 145 MMHG | DIASTOLIC BLOOD PRESSURE: 74 MMHG

## 2022-09-07 VITALS — HEIGHT: 55 IN | WEIGHT: 154 LBS

## 2022-09-07 DIAGNOSIS — Z79.4: ICD-10-CM

## 2022-09-07 DIAGNOSIS — Z90.710: ICD-10-CM

## 2022-09-07 DIAGNOSIS — Z90.49: ICD-10-CM

## 2022-09-07 DIAGNOSIS — Z88.0: ICD-10-CM

## 2022-09-07 DIAGNOSIS — N13.4: Primary | ICD-10-CM

## 2022-09-07 DIAGNOSIS — N39.0: ICD-10-CM

## 2022-09-07 DIAGNOSIS — Z88.1: ICD-10-CM

## 2022-09-07 LAB
ALP SERPL-CCNC: 78 U/L (ref 45–117)
ALT SERPL W P-5'-P-CCNC: 15 U/L (ref 12–78)
AST SERPL-CCNC: 17 IU/L (ref 3–35)
BACTERIA #/AREA URNS HPF: (no result) /[HPF]
BASOPHILS # BLD AUTO: 0.1 10*3/UL (ref 0–0.1)
BASOPHILS NFR BLD AUTO: 0.8 % (ref 0–1)
BILIRUB UR QL STRIP: (no result)
BUN SERPL-MCNC: 12 MG/DL (ref 7–24)
CHLORIDE SERPL-SCNC: 106 MMOL/L (ref 98–107)
CREAT SERPL-MCNC: 0.78 MG/DL (ref 0.55–1.02)
EOSINOPHIL # BLD AUTO: 0.1 10*3/UL (ref 0–0.4)
EOSINOPHIL # BLD AUTO: 2 % (ref 1–4)
EPI CELLS #/AREA URNS HPF: (no result) /[HPF]
ERYTHROCYTE [DISTWIDTH] IN BLOOD BY AUTOMATED COUNT: 14.1 % (ref 0–14.5)
HCT VFR BLD AUTO: 39.7 % (ref 37–47)
HGB UR QL STRIP: (no result)
LEUKOCYTE ESTERASE UR QL STRIP: (no result)
LYMPHOCYTES # BLD AUTO: 1.9 10*3/UL (ref 1.3–4.4)
LYMPHOCYTES NFR BLD AUTO: 26.5 % (ref 27–41)
MCH RBC QN AUTO: 28.2 PG (ref 27–31)
MCHC RBC AUTO-ENTMCNC: 31.5 G/DL (ref 33–37)
MCV RBC AUTO: 89.6 FL (ref 81–99)
MONOCYTES # BLD AUTO: 0.7 10*3/UL (ref 0.1–1)
MONOCYTES NFR BLD MANUAL: 10.3 % (ref 3–9)
NEUT #: 4.2 10*3/UL (ref 2.3–7.9)
NEUT %: 60.1 % (ref 47–73)
NRBC BLD QL AUTO: 0 % (ref 0–0)
PH UR STRIP: 6.5 [PH] (ref 4.5–8)
PLATELET # BLD AUTO: 279 10*3/UL (ref 130–400)
PMV BLD AUTO: 9.1 FL (ref 9.6–12.3)
POTASSIUM SERPL-SCNC: 4.7 MMOL/L (ref 3.5–5.1)
PROT SERPL-MCNC: 7 GM/DL (ref 6.4–8.2)
RBC # BLD AUTO: 4.43 10*6/UL (ref 4.1–5.1)
RBC #/AREA URNS HPF: (no result) RBC/HPF (ref 0–2)
SODIUM SERPL-SCNC: 140 MMOL/L (ref 136–145)
SP GR UR: 1.01 (ref 1–1.03)
UROBILINOGEN UR STRIP-MCNC: 0.2 E.U./DL (ref 0–1)
WBC #/AREA URNS HPF: (no result) WBC/HPF (ref 0–5)
WBC NRBC COR # BLD AUTO: 7.1 10*3/UL (ref 4.8–10.8)

## 2022-09-16 ENCOUNTER — TELEPHONE (OUTPATIENT)
Dept: CARDIAC CATH/INVASIVE PROCEDURES | Age: 67
End: 2022-09-16

## 2022-09-16 NOTE — TELEPHONE ENCOUNTER
Reminded patient of scheduled procedure on 9/19 . Instructions given and COVID questionnaire completed.

## 2022-09-19 ENCOUNTER — HOSPITAL ENCOUNTER (OUTPATIENT)
Dept: CARDIAC CATH/INVASIVE PROCEDURES | Age: 67
Discharge: HOME | End: 2022-09-19
Attending: SURGERY | Admitting: SURGERY
Payer: COMMERCIAL

## 2022-09-19 VITALS
RESPIRATION RATE: 15 BRPM | DIASTOLIC BLOOD PRESSURE: 87 MMHG | HEIGHT: 66 IN | BODY MASS INDEX: 23.63 KG/M2 | WEIGHT: 147 LBS | SYSTOLIC BLOOD PRESSURE: 139 MMHG | TEMPERATURE: 97 F | OXYGEN SATURATION: 100 % | HEART RATE: 79 BPM

## 2022-09-19 DIAGNOSIS — L97.909 PERIPHERAL VASCULAR DISEASE OF LOWER EXTREMITY WITH ULCERATION (HCC): Primary | ICD-10-CM

## 2022-09-19 DIAGNOSIS — I70.269 ATHEROSCLEROSIS OF ARTERY OF EXTREMITY WITH GANGRENE (HCC): Primary | ICD-10-CM

## 2022-09-19 DIAGNOSIS — I73.9 PERIPHERAL VASCULAR DISEASE OF LOWER EXTREMITY WITH ULCERATION (HCC): Primary | ICD-10-CM

## 2022-09-19 LAB
ABO/RH: NORMAL
ANION GAP SERPL CALCULATED.3IONS-SCNC: 12 MMOL/L (ref 7–16)
ANTIBODY SCREEN: NORMAL
BUN BLDV-MCNC: 23 MG/DL (ref 6–23)
CALCIUM SERPL-MCNC: 9.7 MG/DL (ref 8.6–10.2)
CHLORIDE BLD-SCNC: 104 MMOL/L (ref 98–107)
CO2: 22 MMOL/L (ref 22–29)
CREAT SERPL-MCNC: 1 MG/DL (ref 0.5–1)
GFR AFRICAN AMERICAN: >60
GFR NON-AFRICAN AMERICAN: 55 ML/MIN/1.73
GLUCOSE BLD-MCNC: 269 MG/DL (ref 74–99)
HCT VFR BLD CALC: 34.9 % (ref 34–48)
HEMOGLOBIN: 11.5 G/DL (ref 11.5–15.5)
INR BLD: 1.2
MCH RBC QN AUTO: 28.3 PG (ref 26–35)
MCHC RBC AUTO-ENTMCNC: 33 % (ref 32–34.5)
MCV RBC AUTO: 86 FL (ref 80–99.9)
PDW BLD-RTO: 13.7 FL (ref 11.5–15)
PLATELET # BLD: 276 E9/L (ref 130–450)
PMV BLD AUTO: 9 FL (ref 7–12)
POTASSIUM REFLEX MAGNESIUM: 4.4 MMOL/L (ref 3.5–5)
PROTHROMBIN TIME: 13.5 SEC (ref 9.3–12.4)
RBC # BLD: 4.06 E12/L (ref 3.5–5.5)
SODIUM BLD-SCNC: 138 MMOL/L (ref 132–146)
WBC # BLD: 6.3 E9/L (ref 4.5–11.5)

## 2022-09-19 PROCEDURE — 85610 PROTHROMBIN TIME: CPT

## 2022-09-19 PROCEDURE — 37228 HC TIB PER TERRITORY PLASTY: CPT

## 2022-09-19 PROCEDURE — 6360000002 HC RX W HCPCS

## 2022-09-19 PROCEDURE — C1894 INTRO/SHEATH, NON-LASER: HCPCS

## 2022-09-19 PROCEDURE — 85027 COMPLETE CBC AUTOMATED: CPT

## 2022-09-19 PROCEDURE — 86901 BLOOD TYPING SEROLOGIC RH(D): CPT

## 2022-09-19 PROCEDURE — 86900 BLOOD TYPING SEROLOGIC ABO: CPT

## 2022-09-19 PROCEDURE — 2709999900 HC NON-CHARGEABLE SUPPLY

## 2022-09-19 PROCEDURE — C1725 CATH, TRANSLUMIN NON-LASER: HCPCS

## 2022-09-19 PROCEDURE — C1887 CATHETER, GUIDING: HCPCS

## 2022-09-19 PROCEDURE — 75716 ARTERY X-RAYS ARMS/LEGS: CPT

## 2022-09-19 PROCEDURE — 2500000003 HC RX 250 WO HCPCS

## 2022-09-19 PROCEDURE — 36415 COLL VENOUS BLD VENIPUNCTURE: CPT

## 2022-09-19 PROCEDURE — 80048 BASIC METABOLIC PNL TOTAL CA: CPT

## 2022-09-19 PROCEDURE — 86850 RBC ANTIBODY SCREEN: CPT

## 2022-09-19 PROCEDURE — 75710 ARTERY X-RAYS ARM/LEG: CPT | Performed by: SURGERY

## 2022-09-19 PROCEDURE — 75625 CONTRAST EXAM ABDOMINL AORTA: CPT | Performed by: SURGERY

## 2022-09-19 PROCEDURE — 37228 PR REVSC OPN/PRQ TIB/PERO W/ANGIOPLASTY UNI: CPT | Performed by: SURGERY

## 2022-09-19 PROCEDURE — C1769 GUIDE WIRE: HCPCS

## 2022-09-19 PROCEDURE — C1760 CLOSURE DEV, VASC: HCPCS

## 2022-09-19 RX ORDER — SODIUM CHLORIDE 9 MG/ML
INJECTION, SOLUTION INTRAVENOUS PRN
Status: DISCONTINUED | OUTPATIENT
Start: 2022-09-19 | End: 2022-09-19 | Stop reason: HOSPADM

## 2022-09-19 RX ORDER — SODIUM CHLORIDE 9 MG/ML
INJECTION, SOLUTION INTRAVENOUS CONTINUOUS
Status: DISCONTINUED | OUTPATIENT
Start: 2022-09-19 | End: 2022-09-19 | Stop reason: HOSPADM

## 2022-09-19 RX ORDER — CLOPIDOGREL BISULFATE 75 MG/1
75 TABLET ORAL DAILY
Qty: 30 TABLET | Refills: 3 | Status: SHIPPED | OUTPATIENT
Start: 2022-09-19 | End: 2022-09-19

## 2022-09-19 RX ORDER — CHOLECALCIFEROL (VITAMIN D3) 125 MCG
500 CAPSULE ORAL DAILY
COMMUNITY

## 2022-09-19 RX ORDER — CLOPIDOGREL BISULFATE 75 MG/1
75 TABLET ORAL DAILY
Qty: 30 TABLET | Refills: 3 | Status: SHIPPED | OUTPATIENT
Start: 2022-09-19 | End: 2023-01-17

## 2022-09-19 RX ORDER — SODIUM CHLORIDE 0.9 % (FLUSH) 0.9 %
5-40 SYRINGE (ML) INJECTION PRN
Status: DISCONTINUED | OUTPATIENT
Start: 2022-09-19 | End: 2022-09-19 | Stop reason: HOSPADM

## 2022-09-19 RX ORDER — SODIUM CHLORIDE 0.9 % (FLUSH) 0.9 %
5-40 SYRINGE (ML) INJECTION EVERY 12 HOURS SCHEDULED
Status: DISCONTINUED | OUTPATIENT
Start: 2022-09-19 | End: 2022-09-19 | Stop reason: HOSPADM

## 2022-09-19 RX ORDER — LEVOTHYROXINE SODIUM 0.1 MG/1
100 TABLET ORAL DAILY
COMMUNITY

## 2022-09-19 ASSESSMENT — PULMONARY FUNCTION TESTS
PIF_VALUE: 1
PIF_VALUE: 0
PIF_VALUE: 0

## 2022-09-19 NOTE — PROGRESS NOTES
Extended Stay Recovery Discharge Documentation    Final procedure site check completed, stable for discharge- Yes  Ambulated with issue post recovery completion, gait steady. Peripheral IV sites removed (see IV Flowsheet) and site asymptomatic- Yes  Patient dressed self with assistance. Discharge instructions reviewed with Patient and verbalized understanding.    Patient discharged Home with  grandson  at PM  Monitor cleaned and placed back in nurses' station- Yes

## 2022-09-19 NOTE — OP NOTE
Operative Note      Patient: Dotty Ravi  YOB: 1955  MRN: [id number]    Date of procedure: 9/19/2022    Preoperative diagnosis critical limb ischemia left lower extremity    Postoperative diagnosis: Same    Findings: The abdominal aorta is widely patent. Bilateral renals are widely patent. Infrarenal abdominal is widely patent. Common iliacs, hypogastric, and external iliacs are widely patent. The left common femoral, profunda femoris and SFA are widely patent. There is indwelling prosthesis of the hips as well as the knee. The below-knee popliteal was widely patent and the trifurcation is patent. The anterior tibial occludes at its proximal one third and does not reconstitute distally. The peroneal is a dominant runoff with tandem lesions at the takeoff just distal to the tibial peroneal trunk. The posterior tibial is diffusely diseased with multiple stenoses throughout its entire length with very poor filling distally. Surgeon: Lazaro Adams MD    Assistant: None    Anesthesia: Local lidocaine    Estimated blood loss: Less than 20 cc    Procedure:  #1 abdominal aortogram with bilateral lower extremity runoff  #2 balloon angioplasty of the peroneal vessel with a 3 x 80 balloon  #3 percutaneous closure of the common femoral    Description of the procedure: Mindy benefits and alternatives were discussed with patient consent was achieved. Day the procedure the patient was brought to the Cath Lab and placed in supine position. She was prepped and draped in standard sterile fashion time was taken verify the person the operative site as well as the procedure. Fluoroscopy is used identify the femoral head indwelling prosthesis was noted. Ultrasound was used identify the common femoral vessel. Lidocaine was used infiltrate the skin and subcutaneous tissue. Micropuncture needle micropuncture wire and sheath were inserted under ultrasound.   A Bentson wire was placed upsized to a 4 [place] sheath/flushed with heparinized saline solution. A pigtail catheter was brought up to the level suprarenal aortic position images were taken. There is an indwelling inferior vena cava filter. See above for findings. We then crossed over the aortic bifurcation with a pigtail catheter Bentson wire. Images were taken of left extremity see above. At this point there was severe tibial vessel disease. A Glidewire advantage was placed in the popliteal segment. We upsized to a 6 [place] sheath/flushed with heparinized saline solution the patient was heparinized see nursing notes. We then used an angled glide catheter combined with an 014 angled Glidewire advantage. We able to select the peroneal vessel. We then delivered a 3 x 80 balloon just distal to the tibial peroneal trunk. Balloon angioplasty was performed for 3 minutes. Completion images demonstrated widely patent peroneal vessel. At this point I considered may be doing posterior tibial but is diffusely diseased throughout its entire length. I was concerned about knocking off the peroneal vessel therefore we refrained. I did select the anterior tibial vessel however the wire would not traverse. All catheters wires were then removed. Completion images demonstrate a widely patent peroneal vessel. We are in good position for percutaneous closure. Sheath shot was taken. A Pro-glide was deployed the wire was removed and pressure held    At the end of the procedure everything is accounted for the patient Toller procedure well.     Electronically signed by Stella Henderson MD on 9/19/2022 at 9:07 AM

## 2022-09-19 NOTE — DISCHARGE INSTRUCTIONS
Discharge Instructions for Lower extremity angiogram    DO NOT TAKE YOUR METFORMIN UNTIL 9/21. PLEASE GET BMP BLOOD WORK DRAWN ON 9/21. Call Dr. Zuniga Sin  office [phone] for follow-up appointment. Groin Care  - keep clean and dry  - ok to shower or sponge bath  - ok to clean site with lukewarm water and mild soap  - use a soft wash cloth to gently wipe the incision area  - do not scrub the incision areas  - no swimming or baths    Home Care    Follow these guidelines after surgery:   Rest. Try to move as tolerated. A mix of rest and light activity improves healing. The incision area may be sore for a few days. To minimize pain and soreness: Take pain medicine as directed. Avoid strenuous activity and heavy lifting. Diet    You can return to your regular diet. You may work with a dietician who will help you follow a heart-healthy diet. Physical Activity    You will feel sore after the surgery. Try to walk steadily within two weeks. You may be able to return to normal activities within 1-3 weeks. While recovering, you will need to avoid strenuous activities, like heavy lifting. Ask your doctor when you will be able to return to work. Do not drive unless your doctor has given you permission to do so. Medications    Your doctor may recommend:   Over-the-counter or prescription pain medicine   Aspirin or a cholesterol-lowering drug to prevent complications   If you had to stop medicines before the procedure, ask your doctor when you can start again. Medicines that may have been stopped include:   Anti-inflammatory drugs (eg, aspirin, ibuprofen)   Blood thinners, like warfarin (Coumadin)   Clopidogrel (Plavix)   When taking medicines:   Take your medicine as directed. Do not change the amount or the schedule. Do not stop taking them without talking to your doctor. Do not share them. Know what results and side effects to look for. Report them to your doctor.    Some drugs can be dangerous when mixed. Talk to a doctor or pharmacist if you are taking more than one drug. This includes over-the-counter medicines and herb or dietary supplements. Plan ahead for refills so you do not run out. Lifestyle Changes    You and your doctor will plan lifestyle changes that will help you recover. Some things to keep in mind include: Atherosclerosis and high blood pressure should be carefully managed. This can be done with medicines and a healthy lifestyle. If you smoke, talk to your doctor about quitting. Follow-up   Call Your Doctor If Any of the Following Occurs   After you leave the hospital, call your doctor if any of the following occurs:   Redness, swelling, increasing pain, excessive bleeding, or discharge at the incision site   Signs of infection, including fever and chills   Any change of color or sensation in your legs or feet   Burning, pain, or other problems when urinating   Nausea or vomiting   Abdominal cramps or diarrhea   Unusual fatigue or depression   Disorientation or confusion   Numbness or tingling in the legs   New, unexplained symptoms   Cough   Call 911 or go to the emergency room right away if you have:   Shortness of breath   Chest pain   If you think you have an emergency,  CALL 911.

## 2022-09-26 ENCOUNTER — HOSPITAL ENCOUNTER (OUTPATIENT)
Dept: HOSPITAL 83 - WOUNDCARE | Age: 67
Discharge: HOME | End: 2022-09-26
Payer: COMMERCIAL

## 2022-09-26 DIAGNOSIS — Z90.49: ICD-10-CM

## 2022-09-26 DIAGNOSIS — L97.525: ICD-10-CM

## 2022-09-26 DIAGNOSIS — E03.9: ICD-10-CM

## 2022-09-26 DIAGNOSIS — M06.9: ICD-10-CM

## 2022-09-26 DIAGNOSIS — E11.51: ICD-10-CM

## 2022-09-26 DIAGNOSIS — I10: ICD-10-CM

## 2022-09-26 DIAGNOSIS — E11.40: ICD-10-CM

## 2022-09-26 DIAGNOSIS — M20.12: ICD-10-CM

## 2022-09-26 DIAGNOSIS — Z90.710: ICD-10-CM

## 2022-09-26 DIAGNOSIS — E11.621: Primary | ICD-10-CM

## 2022-09-26 DIAGNOSIS — Z86.718: ICD-10-CM

## 2022-09-26 DIAGNOSIS — Z95.828: ICD-10-CM

## 2022-09-26 DIAGNOSIS — E78.5: ICD-10-CM

## 2022-09-29 VITALS — DIASTOLIC BLOOD PRESSURE: 63 MMHG | SYSTOLIC BLOOD PRESSURE: 106 MMHG

## 2022-09-29 LAB
APTT PPP: 31.6 SECONDS (ref 20–32.1)
BASOPHILS # BLD AUTO: 0 10*3/UL (ref 0–0.1)
BASOPHILS NFR BLD AUTO: 0.6 % (ref 0–1)
BUN SERPL-MCNC: 15 MG/DL (ref 7–24)
CHLORIDE SERPL-SCNC: 102 MMOL/L (ref 98–107)
CREAT SERPL-MCNC: 0.96 MG/DL (ref 0.55–1.02)
EOSINOPHIL # BLD AUTO: 0.1 10*3/UL (ref 0–0.4)
EOSINOPHIL # BLD AUTO: 1.9 % (ref 1–4)
ERYTHROCYTE [DISTWIDTH] IN BLOOD BY AUTOMATED COUNT: 13.3 % (ref 0–14.5)
HCT VFR BLD AUTO: 37.1 % (ref 37–47)
INR BLD: 1.1 (ref 2–3.5)
LYMPHOCYTES # BLD AUTO: 1.9 10*3/UL (ref 1.3–4.4)
LYMPHOCYTES NFR BLD AUTO: 30.5 % (ref 27–41)
MCH RBC QN AUTO: 27.1 PG (ref 27–31)
MCHC RBC AUTO-ENTMCNC: 31.5 G/DL (ref 33–37)
MCV RBC AUTO: 86.1 FL (ref 81–99)
MONOCYTES # BLD AUTO: 0.9 10*3/UL (ref 0.1–1)
MONOCYTES NFR BLD MANUAL: 14.9 % (ref 3–9)
NEUT #: 3.3 10*3/UL (ref 2.3–7.9)
NEUT %: 51.8 % (ref 47–73)
NRBC BLD QL AUTO: 0 10*3/UL (ref 0–0)
PLATELET # BLD AUTO: 326 10*3/UL (ref 130–400)
PMV BLD AUTO: 8.8 FL (ref 9.6–12.3)
POTASSIUM SERPL-SCNC: 4.1 MMOL/L (ref 3.5–5.1)
RBC # BLD AUTO: 4.31 10*6/UL (ref 4.1–5.1)
SODIUM SERPL-SCNC: 136 MMOL/L (ref 136–145)
WBC NRBC COR # BLD AUTO: 6.3 10*3/UL (ref 4.8–10.8)

## 2022-10-03 ENCOUNTER — HOSPITAL ENCOUNTER (OUTPATIENT)
Dept: HOSPITAL 83 - SDC | Age: 67
Discharge: HOME | End: 2022-10-03
Attending: PODIATRIST
Payer: COMMERCIAL

## 2022-10-03 VITALS — HEIGHT: 65.98 IN | WEIGHT: 147 LBS | BODY MASS INDEX: 23.63 KG/M2

## 2022-10-03 VITALS — SYSTOLIC BLOOD PRESSURE: 139 MMHG | DIASTOLIC BLOOD PRESSURE: 55 MMHG

## 2022-10-03 VITALS — DIASTOLIC BLOOD PRESSURE: 65 MMHG

## 2022-10-03 VITALS — DIASTOLIC BLOOD PRESSURE: 73 MMHG

## 2022-10-03 DIAGNOSIS — Z96.643: ICD-10-CM

## 2022-10-03 DIAGNOSIS — E03.9: ICD-10-CM

## 2022-10-03 DIAGNOSIS — Z90.49: ICD-10-CM
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
## 2022-10-03 DIAGNOSIS — Z79.899: ICD-10-CM
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
## 2022-10-03 DIAGNOSIS — E11.51: ICD-10-CM

## 2022-10-03 DIAGNOSIS — F41.9: ICD-10-CM

## 2022-10-03 DIAGNOSIS — M19.90: ICD-10-CM

## 2022-10-03 DIAGNOSIS — Z96.653: ICD-10-CM

## 2022-10-03 DIAGNOSIS — Z86.718: ICD-10-CM

## 2022-10-03 DIAGNOSIS — M86.672: Primary | ICD-10-CM

## 2022-10-03 DIAGNOSIS — S91.302A: ICD-10-CM

## 2022-10-03 DIAGNOSIS — M79.7: ICD-10-CM

## 2022-10-03 DIAGNOSIS — Z90.710: ICD-10-CM

## 2022-10-03 DIAGNOSIS — F32.9: ICD-10-CM

## 2022-10-03 DIAGNOSIS — Z79.01: ICD-10-CM
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
## 2022-10-03 DIAGNOSIS — M06.9: ICD-10-CM
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
## 2022-10-04 LAB — SPECIMEN PREPARATION: (no result)

## 2022-10-10 ENCOUNTER — HOSPITAL ENCOUNTER (OUTPATIENT)
Dept: HOSPITAL 83 - WOUNDCARE | Age: 67
End: 2022-10-10
Payer: COMMERCIAL

## 2022-10-10 DIAGNOSIS — Z95.828: ICD-10-CM

## 2022-10-10 DIAGNOSIS — Z90.710: ICD-10-CM

## 2022-10-10 DIAGNOSIS — E11.40: ICD-10-CM

## 2022-10-10 DIAGNOSIS — E11.52: ICD-10-CM

## 2022-10-10 DIAGNOSIS — E11.621: ICD-10-CM

## 2022-10-10 DIAGNOSIS — L97.522: ICD-10-CM

## 2022-10-10 DIAGNOSIS — I96: ICD-10-CM
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
## 2022-10-10 DIAGNOSIS — M06.9: ICD-10-CM

## 2022-10-10 DIAGNOSIS — I10: ICD-10-CM

## 2022-10-10 DIAGNOSIS — Z90.49: ICD-10-CM

## 2022-10-10 DIAGNOSIS — Z86.718: ICD-10-CM

## 2022-10-10 DIAGNOSIS — T87.89: Primary | ICD-10-CM

## 2022-10-10 DIAGNOSIS — M20.12: ICD-10-CM
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
## 2022-10-12 ENCOUNTER — HOSPITAL ENCOUNTER (OUTPATIENT)
Dept: HOSPITAL 83 - LAB | Age: 67
Discharge: HOME | End: 2022-10-12
Payer: COMMERCIAL

## 2022-10-12 DIAGNOSIS — M86.172: Primary | ICD-10-CM

## 2022-10-12 LAB
BASOPHILS # BLD AUTO: 0.1 10*3/UL (ref 0–0.1)
BASOPHILS NFR BLD AUTO: 0.9 % (ref 0–1)
BUN SERPL-MCNC: 18 MG/DL (ref 7–24)
CHLORIDE SERPL-SCNC: 103 MMOL/L (ref 98–107)
CREAT SERPL-MCNC: 1.01 MG/DL (ref 0.55–1.02)
EOSINOPHIL # BLD AUTO: 0.1 10*3/UL (ref 0–0.4)
EOSINOPHIL # BLD AUTO: 1.3 % (ref 1–4)
ERYTHROCYTE [DISTWIDTH] IN BLOOD BY AUTOMATED COUNT: 13.4 % (ref 0–14.5)
HCT VFR BLD AUTO: 35.6 % (ref 37–47)
LYMPHOCYTES # BLD AUTO: 2.2 10*3/UL (ref 1.3–4.4)
LYMPHOCYTES NFR BLD AUTO: 39.9 % (ref 27–41)
MCH RBC QN AUTO: 26.7 PG (ref 27–31)
MCHC RBC AUTO-ENTMCNC: 31.5 G/DL (ref 33–37)
MCV RBC AUTO: 85 FL (ref 81–99)
MONOCYTES # BLD AUTO: 0.6 10*3/UL (ref 0.1–1)
MONOCYTES NFR BLD MANUAL: 10.4 % (ref 3–9)
NEUT #: 2.6 10*3/UL (ref 2.3–7.9)
NEUT %: 47.1 % (ref 47–73)
NRBC BLD QL AUTO: 0 % (ref 0–0)
PLATELET # BLD AUTO: 292 10*3/UL (ref 130–400)
PMV BLD AUTO: 8.6 FL (ref 9.6–12.3)
POTASSIUM SERPL-SCNC: 4.8 MMOL/L (ref 3.5–5.1)
RBC # BLD AUTO: 4.19 10*6/UL (ref 4.1–5.1)
SODIUM SERPL-SCNC: 134 MMOL/L (ref 136–145)
WBC NRBC COR # BLD AUTO: 5.4 10*3/UL (ref 4.8–10.8)

## 2022-10-17 ENCOUNTER — HOSPITAL ENCOUNTER (OUTPATIENT)
Dept: HOSPITAL 83 - WOUNDCARE | Age: 67
Discharge: HOME | End: 2022-10-17
Payer: COMMERCIAL

## 2022-10-17 DIAGNOSIS — M86.672: ICD-10-CM

## 2022-10-17 DIAGNOSIS — E11.69: ICD-10-CM

## 2022-10-17 DIAGNOSIS — L97.522: ICD-10-CM

## 2022-10-17 DIAGNOSIS — I10: ICD-10-CM
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
## 2022-10-17 DIAGNOSIS — I96: ICD-10-CM

## 2022-10-17 DIAGNOSIS — M06.9: ICD-10-CM

## 2022-10-17 DIAGNOSIS — Z89.422: ICD-10-CM

## 2022-10-17 DIAGNOSIS — Z86.718: ICD-10-CM

## 2022-10-17 DIAGNOSIS — Z90.710: ICD-10-CM

## 2022-10-17 DIAGNOSIS — E11.40: ICD-10-CM

## 2022-10-17 DIAGNOSIS — Z90.49: ICD-10-CM

## 2022-10-17 DIAGNOSIS — E03.9: ICD-10-CM

## 2022-10-17 DIAGNOSIS — E78.5: ICD-10-CM

## 2022-10-17 DIAGNOSIS — T81.89XD: Primary | ICD-10-CM

## 2022-10-17 DIAGNOSIS — E11.52: ICD-10-CM

## 2022-10-18 ENCOUNTER — TELEPHONE (OUTPATIENT)
Dept: VASCULAR SURGERY | Age: 67
End: 2022-10-18

## 2022-10-20 ENCOUNTER — HOSPITAL ENCOUNTER (OUTPATIENT)
Dept: HOSPITAL 83 - LAB | Age: 67
Discharge: HOME | End: 2022-10-20
Payer: COMMERCIAL

## 2022-10-20 DIAGNOSIS — D64.9: ICD-10-CM

## 2022-10-20 DIAGNOSIS — Z79.01: ICD-10-CM

## 2022-10-20 DIAGNOSIS — E11.9: ICD-10-CM

## 2022-10-20 DIAGNOSIS — I82.419: Primary | ICD-10-CM

## 2022-10-20 DIAGNOSIS — I73.9: ICD-10-CM

## 2022-10-20 LAB
BASOPHILS # BLD AUTO: 0 10*3/UL (ref 0–0.1)
BASOPHILS NFR BLD AUTO: 0.4 % (ref 0–1)
BUN SERPL-MCNC: 19 MG/DL (ref 7–24)
CHLORIDE SERPL-SCNC: 106 MMOL/L (ref 98–107)
CREAT SERPL-MCNC: 0.71 MG/DL (ref 0.55–1.02)
EOSINOPHIL # BLD AUTO: 0.1 10*3/UL (ref 0–0.4)
EOSINOPHIL # BLD AUTO: 1.2 % (ref 1–4)
ERYTHROCYTE [DISTWIDTH] IN BLOOD BY AUTOMATED COUNT: 13.6 % (ref 0–14.5)
HCT VFR BLD AUTO: 32.9 % (ref 37–47)
LYMPHOCYTES # BLD AUTO: 2.2 10*3/UL (ref 1.3–4.4)
LYMPHOCYTES NFR BLD AUTO: 44.5 % (ref 27–41)
MCH RBC QN AUTO: 26.8 PG (ref 27–31)
MCHC RBC AUTO-ENTMCNC: 31.3 G/DL (ref 33–37)
MCV RBC AUTO: 85.5 FL (ref 81–99)
MONOCYTES # BLD AUTO: 0.3 10*3/UL (ref 0.1–1)
MONOCYTES NFR BLD MANUAL: 6.6 % (ref 3–9)
NEUT #: 2.3 10*3/UL (ref 2.3–7.9)
NEUT %: 47.1 % (ref 47–73)
NRBC BLD QL AUTO: 0 10*3/UL (ref 0–0)
PLATELET # BLD AUTO: 201 10*3/UL (ref 130–400)
PMV BLD AUTO: 8.5 FL (ref 9.6–12.3)
POTASSIUM SERPL-SCNC: 4.7 MMOL/L (ref 3.5–5.1)
RBC # BLD AUTO: 3.85 10*6/UL (ref 4.1–5.1)
SODIUM SERPL-SCNC: 142 MMOL/L (ref 136–145)
WBC NRBC COR # BLD AUTO: 4.9 10*3/UL (ref 4.8–10.8)

## 2022-10-21 LAB
CARDIOLIPIN IGA SER IA-ACNC: <9 APL U/ML (ref 0–11)
CARDIOLIPIN IGG SER IA-ACNC: <9 GPL U/ML (ref 0–14)
CARDIOLIPIN IGM SER IA-ACNC: <9 MPL U/ML (ref 0–12)

## 2022-10-24 ENCOUNTER — HOSPITAL ENCOUNTER (OUTPATIENT)
Dept: HOSPITAL 83 - WOUNDCARE | Age: 67
Discharge: HOME | End: 2022-10-24
Payer: COMMERCIAL

## 2022-10-24 DIAGNOSIS — E03.9: ICD-10-CM

## 2022-10-24 DIAGNOSIS — E11.69: ICD-10-CM
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
## 2022-10-24 DIAGNOSIS — I10: ICD-10-CM

## 2022-10-24 DIAGNOSIS — E11.52: ICD-10-CM

## 2022-10-24 DIAGNOSIS — Z90.710: ICD-10-CM

## 2022-10-24 DIAGNOSIS — L97.522: ICD-10-CM

## 2022-10-24 DIAGNOSIS — E11.40: ICD-10-CM

## 2022-10-24 DIAGNOSIS — Z90.49: ICD-10-CM

## 2022-10-24 DIAGNOSIS — E11.621: ICD-10-CM

## 2022-10-24 DIAGNOSIS — E78.5: ICD-10-CM

## 2022-10-24 DIAGNOSIS — M06.9: ICD-10-CM

## 2022-10-24 DIAGNOSIS — Z86.718: ICD-10-CM

## 2022-10-24 DIAGNOSIS — M20.12: ICD-10-CM

## 2022-10-24 DIAGNOSIS — M86.672: ICD-10-CM

## 2022-10-24 DIAGNOSIS — I96: ICD-10-CM

## 2022-10-24 DIAGNOSIS — T87.89: Primary | ICD-10-CM

## 2022-10-26 ENCOUNTER — HOSPITAL ENCOUNTER (OUTPATIENT)
Dept: HOSPITAL 83 - WOUNDCARE | Age: 67
End: 2022-10-26
Attending: NURSE PRACTITIONER
Payer: COMMERCIAL

## 2022-10-26 DIAGNOSIS — Z53.21: Primary | ICD-10-CM

## 2022-10-31 ENCOUNTER — HOSPITAL ENCOUNTER (OUTPATIENT)
Dept: HOSPITAL 83 - WOUNDCARE | Age: 67
Discharge: HOME | End: 2022-10-31
Payer: COMMERCIAL

## 2022-10-31 DIAGNOSIS — E78.5: ICD-10-CM

## 2022-10-31 DIAGNOSIS — E03.9: ICD-10-CM

## 2022-10-31 DIAGNOSIS — L97.522: ICD-10-CM

## 2022-10-31 DIAGNOSIS — Z90.49: ICD-10-CM

## 2022-10-31 DIAGNOSIS — M06.9: ICD-10-CM
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
## 2022-10-31 DIAGNOSIS — G62.9: ICD-10-CM

## 2022-10-31 DIAGNOSIS — M86.672: ICD-10-CM

## 2022-10-31 DIAGNOSIS — M20.12: ICD-10-CM

## 2022-10-31 DIAGNOSIS — Z86.718: ICD-10-CM

## 2022-10-31 DIAGNOSIS — T81.89XD: Primary | ICD-10-CM

## 2022-10-31 DIAGNOSIS — I10: ICD-10-CM

## 2022-10-31 DIAGNOSIS — I96: ICD-10-CM

## 2022-10-31 DIAGNOSIS — Z90.710: ICD-10-CM

## 2022-11-01 ENCOUNTER — TELEPHONE (OUTPATIENT)
Dept: VASCULAR SURGERY | Age: 67
End: 2022-11-01

## 2022-11-02 ENCOUNTER — OFFICE VISIT (OUTPATIENT)
Dept: VASCULAR SURGERY | Age: 67
End: 2022-11-02
Payer: COMMERCIAL

## 2022-11-02 VITALS — DIASTOLIC BLOOD PRESSURE: 62 MMHG | SYSTOLIC BLOOD PRESSURE: 124 MMHG

## 2022-11-02 DIAGNOSIS — I73.9 PERIPHERAL VASCULAR DISEASE OF LOWER EXTREMITY WITH ULCERATION (HCC): Primary | ICD-10-CM

## 2022-11-02 DIAGNOSIS — L97.909 PERIPHERAL VASCULAR DISEASE OF LOWER EXTREMITY WITH ULCERATION (HCC): Primary | ICD-10-CM

## 2022-11-02 PROCEDURE — G8484 FLU IMMUNIZE NO ADMIN: HCPCS | Performed by: SURGERY

## 2022-11-02 PROCEDURE — 4004F PT TOBACCO SCREEN RCVD TLK: CPT | Performed by: SURGERY

## 2022-11-02 PROCEDURE — 99213 OFFICE O/P EST LOW 20 MIN: CPT | Performed by: SURGERY

## 2022-11-02 PROCEDURE — 3017F COLORECTAL CA SCREEN DOC REV: CPT | Performed by: SURGERY

## 2022-11-02 PROCEDURE — 1090F PRES/ABSN URINE INCON ASSESS: CPT | Performed by: SURGERY

## 2022-11-02 PROCEDURE — G8427 DOCREV CUR MEDS BY ELIG CLIN: HCPCS | Performed by: SURGERY

## 2022-11-02 PROCEDURE — G8420 CALC BMI NORM PARAMETERS: HCPCS | Performed by: SURGERY

## 2022-11-02 PROCEDURE — G8400 PT W/DXA NO RESULTS DOC: HCPCS | Performed by: SURGERY

## 2022-11-02 PROCEDURE — 1123F ACP DISCUSS/DSCN MKR DOCD: CPT | Performed by: SURGERY

## 2022-11-02 NOTE — PROGRESS NOTES
1/17/23 Yes Rhonda Olmos PA-C   propranolol (INDERAL) 20 MG tablet Take 20 mg by mouth 3 times daily   Yes Historical Provider, MD   Multiple Vitamins-Minerals (THERAPEUTIC MULTIVITAMIN-MINERALS) tablet Take 1 tablet by mouth daily   Yes Historical Provider, MD   pioglitazone (ACTOS) 15 MG tablet Take 15 mg by mouth daily   Yes Historical Provider, MD   Cholecalciferol (VITAMIN D) 50 MCG (2000 UT) CAPS capsule Take by mouth   Yes Historical Provider, MD   oxyCODONE (ROXICODONE) 5 MG immediate release tablet Take 5 mg by mouth 4 times daily as needed. 7/12/22  Yes Historical Provider, MD   LANTUS 100 UNIT/ML injection vial Inject 100 Units into the skin once as needed 6/11/22  Yes Historical Provider, MD   glyBURIDE (DIABETA) 5 MG tablet Take 5 mg by mouth in the morning.  7/14/22  Yes Historical Provider, MD   metFORMIN (GLUCOPHAGE) 1000 MG tablet Take 1,000 mg by mouth 2 times daily (with meals) 5/12/22  Yes Historical Provider, MD   sulfaSALAzine (AZULFIDINE) 500 MG tablet Take 500 mg by mouth 2 times daily 6/22/22  Yes Historical Provider, MD   omeprazole (PRILOSEC) 40 MG delayed release capsule Take 10 mg by mouth 7/5/22  Yes Historical Provider, MD     Allergies:  Biaxin [clarithromycin] and Pcn [penicillins]    Social History     Socioeconomic History    Marital status:      Spouse name: Not on file    Number of children: Not on file    Years of education: Not on file    Highest education level: Not on file   Occupational History    Not on file   Tobacco Use    Smoking status: Not on file    Smokeless tobacco: Never   Substance and Sexual Activity    Alcohol use: Never    Drug use: Never    Sexual activity: Not on file   Other Topics Concern    Not on file   Social History Narrative    Not on file     Social Determinants of Health     Financial Resource Strain: Not on file   Food Insecurity: Not on file   Transportation Needs: Not on file   Physical Activity: Not on file   Stress: Not on file Social Connections: Not on file   Intimate Partner Violence: Not on file   Housing Stability: Not on file        No family history on file.     REVIEW OF SYSTEMS (New symptoms):    Eyes:      Blurred vision:  No [x]/Yes []               Diplopia:   No [x]/Yes []               Vision loss:       No [x]/Yes []   Ears, nose, throat:             Hearing loss:    No [x]/Yes []      Vertigo:   No [x]/Yes []                       Swallowing problem:  No [x]/Yes []               Nose bleeds:   No [x]/Yes []      Voice hoarseness:  No [x]/Yes []  Respiratory:             Cough:   No [x]/Yes []      Pleuritic chest pain:  No [x]/Yes []                        Dyspnea:   No [x]/Yes []      Wheezing:   No [x]/Yes []  Cardiovascular:             Angina:   No [x]/Yes []      Palpitations:   No [x]/Yes []          Claudication:    No [x]/Yes []      Leg swelling:   No [x]/Yes []  Gastrointestinal:             Nausea or vomiting:  No [x]/Yes []               Abdominal pain:  No [x]/Yes []                     Intestinal bleeding: No [x]/Yes []  Musculoskeletal:             Leg pain:   No [x]/Yes []      Back pain:   No [x]/Yes []                    Weakness:   No [x]/Yes []  Neurologic:             Numbness:   No [x]/Yes []      Paralysis:   No [x]/Yes []                       Headaches:   No [x]/Yes []  Hematologic, lymphatic:   Anemia:   No [x]/Yes []              Bleeding or bruising:  No [x]/Yes []              Fevers or chills: No [x]/Yes []  Endocrine:             Temp intolerance:   No [x]/Yes []                       Polydipsia, polyuria:  No [x]/Yes []  Skin:              Rash:    No [x]/Yes []      Ulcers:   No [x]/Yes []              Abnorm pigment: No [x]/Yes []  :              Frequency/urgency:  No [x]/Yes []      Hematuria:    No [x]/Yes []                      Incontinence:    No [x]/Yes []  Vascular: See HPI musculoskeletal see HPI  PHYSICAL EXAM:  Vitals:    11/02/22 1325   BP: 124/62     General Appearance: alert and oriented to person, place and time, well developed and well- nourished, in no acute distress  Skin: warm and dry, no rash or erythema, dry eschars noted of the second toe  Head: normocephalic and atraumatic  Eyes: extraocular eye movements intact, conjunctivae normal  ENT: external ear and ear canal normal bilaterally, nose without deformity  Pulmonary/Chest: clear to auscultation bilaterally- no wheezes, rales or rhonchi, normal air movement, no respiratory distress  Cardiovascular: normal rate, regular rhythm, normal S1 and S2, no murmurs, no carotid bruits  Abdomen: soft, non-tender, non-distended, normal bowel sounds, no masses or organomegaly  Musculoskeletal: normal range of motion, no joint swelling, deformity or tenderness  Neurologic: no cranial nerve deficit, gait, coordination and speech normal  Extremities: Palpable brachial radial pulse. Motor and sensation are intact. Left lower extremities currently wrapped. She was able to show me some pictures of the recent surgery she had which appears to be healing very nicely. Operative Note        Patient: Hilton Fuentes  YOB: 1955  MRN: [id number]     Date of procedure: 9/19/2022     Preoperative diagnosis critical limb ischemia left lower extremity     Postoperative diagnosis: Same     Findings: The abdominal aorta is widely patent. Bilateral renals are widely patent. Infrarenal abdominal is widely patent. Common iliacs, hypogastric, and external iliacs are widely patent. The left common femoral, profunda femoris and SFA are widely patent. There is indwelling prosthesis of the hips as well as the knee. The below-knee popliteal was widely patent and the trifurcation is patent. The anterior tibial occludes at its proximal one third and does not reconstitute distally. The peroneal is a dominant runoff with tandem lesions at the takeoff just distal to the tibial peroneal trunk.   The posterior tibial is diffusely diseased with multiple stenoses throughout its entire length with very poor filling distally. Surgeon: Jalen Mayo MD     Assistant: None     Anesthesia: Local lidocaine     Estimated blood loss: Less than 20 cc     Procedure:  #1 abdominal aortogram with bilateral lower extremity runoff  #2 balloon angioplasty of the peroneal vessel with a 3 x 80 balloon  #3 percutaneous closure of the common femoral     Description of the procedure: Mindy benefits and alternatives were discussed with patient consent was achieved. Day the procedure the patient was brought to the Cath Lab and placed in supine position. She was prepped and draped in standard sterile fashion time was taken verify the person the operative site as well as the procedure. Fluoroscopy is used identify the femoral head indwelling prosthesis was noted. Ultrasound was used identify the common femoral vessel. Lidocaine was used infiltrate the skin and subcutaneous tissue. Micropuncture needle micropuncture wire and sheath were inserted under ultrasound. A Bentson wire was placed upsized to a 4 Ailyn Eans sheath/flushed with heparinized saline solution. A pigtail catheter was brought up to the level suprarenal aortic position images were taken. There is an indwelling inferior vena cava filter. See above for findings. We then crossed over the aortic bifurcation with a pigtail catheter Bentson wire. Images were taken of left extremity see above. At this point there was severe tibial vessel disease. A Glidewire advantage was placed in the popliteal segment. We upsized to a 6 Ailyn Eans sheath/flushed with heparinized saline solution the patient was heparinized see nursing notes. We then used an angled glide catheter combined with an 014 angled Glidewire advantage. We able to select the peroneal vessel. We then delivered a 3 x 80 balloon just distal to the tibial peroneal trunk.   Balloon angioplasty was performed for 3 minutes. Completion images demonstrated widely patent peroneal vessel. At this point I considered may be doing posterior tibial but is diffusely diseased throughout its entire length. I was concerned about knocking off the peroneal vessel therefore we refrained. I did select the anterior tibial vessel however the wire would not traverse. All catheters wires were then removed. Completion images demonstrate a widely patent peroneal vessel. We are in good position for percutaneous closure. Sheath shot was taken. A Pro-glide was deployed the wire was removed and pressure held     At the end of the procedure everything is accounted for the patient Toller procedure well. Electronically signed by Claude Hyde MD on 9/19/2022 at 9:07 AM                 Admission (Discharged) from ABDOMINAL AORTOGRAM on 9/19/2022    Admission (Discharged) from ABDOMINAL AORTOGRAM on 9/19/2022  Problem List Items Addressed This Visit       Peripheral vascular disease of lower extremity with ulceration ([facility] [address] - Primary           #1 left lower extremity second toe ulcer/eschar/dry gangrene. Status post revascularization and she is status post podiatric intervention. Overall the wounds are healing I personally could not evaluate if secondary to the wrap that she is in she showed me the recent pictures which demonstrate that they are almost healed. She will have her pins removed at her next office visit. I like to see her back in 1 month to make sure everything looks like it is finished the healing process.     Jhonatan Silva

## 2022-11-07 ENCOUNTER — HOSPITAL ENCOUNTER (OUTPATIENT)
Dept: HOSPITAL 83 - WOUNDCARE | Age: 67
Discharge: HOME | End: 2022-11-07
Payer: COMMERCIAL

## 2022-11-07 DIAGNOSIS — Z86.718: ICD-10-CM

## 2022-11-07 DIAGNOSIS — T87.89: Primary | ICD-10-CM

## 2022-11-07 DIAGNOSIS — Z90.710: ICD-10-CM

## 2022-11-07 DIAGNOSIS — M86.672: ICD-10-CM

## 2022-11-07 DIAGNOSIS — I10: ICD-10-CM

## 2022-11-07 DIAGNOSIS — E11.40: ICD-10-CM

## 2022-11-07 DIAGNOSIS — Z89.422: ICD-10-CM
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
## 2022-11-07 DIAGNOSIS — Z90.49: ICD-10-CM

## 2022-11-07 DIAGNOSIS — L97.522: ICD-10-CM

## 2022-11-07 DIAGNOSIS — M06.9: ICD-10-CM

## 2022-11-07 DIAGNOSIS — E03.9: ICD-10-CM

## 2022-11-07 DIAGNOSIS — I96: ICD-10-CM

## 2022-11-07 DIAGNOSIS — E78.5: ICD-10-CM

## 2022-11-14 ENCOUNTER — HOSPITAL ENCOUNTER (OUTPATIENT)
Dept: HOSPITAL 83 - WOUNDCARE | Age: 67
Discharge: HOME | End: 2022-11-14
Payer: COMMERCIAL

## 2022-11-14 DIAGNOSIS — M06.9: ICD-10-CM

## 2022-11-14 DIAGNOSIS — E03.9: ICD-10-CM

## 2022-11-14 DIAGNOSIS — M86.672: ICD-10-CM
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
## 2022-11-14 DIAGNOSIS — E11.69: ICD-10-CM

## 2022-11-14 DIAGNOSIS — Z90.49: ICD-10-CM

## 2022-11-14 DIAGNOSIS — Z86.718: ICD-10-CM

## 2022-11-14 DIAGNOSIS — Z90.710: ICD-10-CM

## 2022-11-14 DIAGNOSIS — I10: ICD-10-CM

## 2022-11-14 DIAGNOSIS — E11.621: ICD-10-CM

## 2022-11-14 DIAGNOSIS — L97.522: ICD-10-CM

## 2022-11-14 DIAGNOSIS — E78.5: ICD-10-CM

## 2022-11-14 DIAGNOSIS — T81.89XD: Primary | ICD-10-CM

## 2022-11-14 DIAGNOSIS — Z89.422: ICD-10-CM

## 2022-11-14 DIAGNOSIS — I96: ICD-10-CM

## 2022-11-14 DIAGNOSIS — E11.52: ICD-10-CM

## 2022-11-21 ENCOUNTER — HOSPITAL ENCOUNTER (OUTPATIENT)
Dept: HOSPITAL 83 - WOUNDCARE | Age: 67
Discharge: HOME | End: 2022-11-21
Payer: COMMERCIAL

## 2022-11-21 DIAGNOSIS — I10: ICD-10-CM

## 2022-11-21 DIAGNOSIS — Z90.710: ICD-10-CM

## 2022-11-21 DIAGNOSIS — T87.89: Primary | ICD-10-CM

## 2022-11-21 DIAGNOSIS — Z90.49: ICD-10-CM

## 2022-11-21 DIAGNOSIS — E03.9: ICD-10-CM

## 2022-11-21 DIAGNOSIS — M06.9: ICD-10-CM
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
## 2022-11-21 DIAGNOSIS — L97.522: ICD-10-CM

## 2022-11-21 DIAGNOSIS — M20.12: ICD-10-CM

## 2022-11-21 DIAGNOSIS — E78.5: ICD-10-CM

## 2022-11-21 DIAGNOSIS — G62.9: ICD-10-CM

## 2022-11-21 DIAGNOSIS — I96: ICD-10-CM

## 2022-11-21 DIAGNOSIS — M86.672: ICD-10-CM

## 2022-11-21 DIAGNOSIS — Z86.718: ICD-10-CM

## 2022-11-29 ENCOUNTER — TELEPHONE (OUTPATIENT)
Dept: VASCULAR SURGERY | Age: 67
End: 2022-11-29

## 2022-12-14 ENCOUNTER — TELEPHONE (OUTPATIENT)
Dept: VASCULAR SURGERY | Age: 67
End: 2022-12-14

## 2022-12-14 ENCOUNTER — OFFICE VISIT (OUTPATIENT)
Dept: VASCULAR SURGERY | Age: 67
End: 2022-12-14
Payer: COMMERCIAL

## 2022-12-14 VITALS — WEIGHT: 147 LBS | HEIGHT: 66 IN | BODY MASS INDEX: 23.63 KG/M2

## 2022-12-14 DIAGNOSIS — I73.9 PVD (PERIPHERAL VASCULAR DISEASE) (HCC): Primary | ICD-10-CM

## 2022-12-14 DIAGNOSIS — L97.909 PERIPHERAL VASCULAR DISEASE OF LOWER EXTREMITY WITH ULCERATION (HCC): ICD-10-CM

## 2022-12-14 DIAGNOSIS — I73.9 PERIPHERAL VASCULAR DISEASE OF LOWER EXTREMITY WITH ULCERATION (HCC): ICD-10-CM

## 2022-12-14 DIAGNOSIS — I70.269 ATHEROSCLEROSIS OF ARTERY OF EXTREMITY WITH GANGRENE (HCC): ICD-10-CM

## 2022-12-14 PROCEDURE — 1090F PRES/ABSN URINE INCON ASSESS: CPT | Performed by: NURSE PRACTITIONER

## 2022-12-14 PROCEDURE — 1036F TOBACCO NON-USER: CPT | Performed by: NURSE PRACTITIONER

## 2022-12-14 PROCEDURE — G8420 CALC BMI NORM PARAMETERS: HCPCS | Performed by: NURSE PRACTITIONER

## 2022-12-14 PROCEDURE — G8484 FLU IMMUNIZE NO ADMIN: HCPCS | Performed by: NURSE PRACTITIONER

## 2022-12-14 PROCEDURE — 99212 OFFICE O/P EST SF 10 MIN: CPT | Performed by: NURSE PRACTITIONER

## 2022-12-14 PROCEDURE — 3017F COLORECTAL CA SCREEN DOC REV: CPT | Performed by: NURSE PRACTITIONER

## 2022-12-14 PROCEDURE — G8427 DOCREV CUR MEDS BY ELIG CLIN: HCPCS | Performed by: NURSE PRACTITIONER

## 2022-12-14 PROCEDURE — G8400 PT W/DXA NO RESULTS DOC: HCPCS | Performed by: NURSE PRACTITIONER

## 2022-12-14 PROCEDURE — 1123F ACP DISCUSS/DSCN MKR DOCD: CPT | Performed by: NURSE PRACTITIONER

## 2022-12-14 NOTE — TELEPHONE ENCOUNTER
Left message regarding testing at [facility], [facility] on Tuesday, 12-20-22 at 2:30 pm.  [place] at 2:00 pm.  6 month follow up 6-21-23 at 1:00 pm.

## 2022-12-19 NOTE — PROGRESS NOTES
Vascular Surgery Outpatient Consultation        Reason for Consult:    Chief Complaint   Patient presents with    Follow-up     1 month f/u angio       Requesting Physician:  No referring provider defined for this encounter. Chief Complaint   Patient presents with    Follow-up     1 month f/u angio       HISTORY OF PRESENT ILLNESS:                The patient is a 79 y.o. female who is referred for evaluation of left second toe wound. She describes a left second toe wound for approximately 3 months now. She is been following with podiatry. They are planning a second toe amputation. She has been sent for further evaluation. She has a history of diabetes for 20 years. She is a non-smoker. She denies any chest pain shortness of breath or discomfort. She denies any worsening of her toe or other toes. She has a history of cellulitis in that lower extremity. She also has a history of a deep venous thrombosis which she currently is taking Xarelto. She denies any significant history of claudication. She denies any history of rest pain and this the first time she is ever had a wound. .    As above. She otherwise is doing well. She has no current complaints. She told me that the wound is healed. She denies any chest pain shortness of breath or discomfort. She denies any pain or discomfort at the access site. She is following up with her podiatrist.  I have reviewed my operative note    Past Medical History:        Diagnosis Date    Arthritis     DM (diabetes mellitus) ([facility] [address]     Hypothyroid      Past Surgical History:        Procedure Laterality Date    GALLBLADDER SURGERY      HIP SURGERY      HYSTERECTOMY (CERVIX STATUS UNKNOWN)      KNEE SURGERY       Current Medications:   Prior to Admission medications    Medication Sig Start Date End Date Taking?  Authorizing Provider   levothyroxine (SYNTHROID) 100 MCG tablet Take 100 mcg by mouth Daily   Yes Historical Provider, MD   vitamin B-12 (CYANOCOBALAMIN) 500 MCG tablet Take 500 mcg by mouth daily   Yes Historical Provider, MD   insulin lispro (HUMALOG) 100 UNIT/ML injection vial Inject into the skin 3 times daily (before meals) Sliding scale as needed   Yes Historical Provider, MD   clopidogrel (PLAVIX) 75 MG tablet Take 1 tablet by mouth daily for 120 doses 9/19/22 1/17/23 Yes Rhonda Olmos PA-C   propranolol (INDERAL) 20 MG tablet Take 20 mg by mouth 3 times daily   Yes Historical Provider, MD   Multiple Vitamins-Minerals (THERAPEUTIC MULTIVITAMIN-MINERALS) tablet Take 1 tablet by mouth daily   Yes Historical Provider, MD   pioglitazone (ACTOS) 15 MG tablet Take 15 mg by mouth daily   Yes Historical Provider, MD   Cholecalciferol (VITAMIN D) 50 MCG (2000 UT) CAPS capsule Take by mouth   Yes Historical Provider, MD   oxyCODONE (ROXICODONE) 5 MG immediate release tablet Take 5 mg by mouth 4 times daily as needed. 7/12/22  Yes Historical Provider, MD   LANTUS 100 UNIT/ML injection vial Inject 100 Units into the skin once as needed 6/11/22  Yes Historical Provider, MD   glyBURIDE (DIABETA) 5 MG tablet Take 5 mg by mouth in the morning.  7/14/22  Yes Historical Provider, MD   metFORMIN (GLUCOPHAGE) 1000 MG tablet Take 1,000 mg by mouth 2 times daily (with meals) 5/12/22  Yes Historical Provider, MD   sulfaSALAzine (AZULFIDINE) 500 MG tablet Take 500 mg by mouth 2 times daily 6/22/22  Yes Historical Provider, MD   omeprazole (PRILOSEC) 40 MG delayed release capsule Take 10 mg by mouth 7/5/22  Yes Historical Provider, MD     Allergies:  Biaxin [clarithromycin] and Pcn [penicillins]    Social History     Socioeconomic History    Marital status:      Spouse name: Not on file    Number of children: Not on file    Years of education: Not on file    Highest education level: Not on file   Occupational History    Not on file   Tobacco Use    Smoking status: Never    Smokeless tobacco: Never   Substance and Sexual Activity    Alcohol use: Never    Drug use: Never Sexual activity: Not on file   Other Topics Concern    Not on file   Social History Narrative    Not on file     Social Determinants of Health     Financial Resource Strain: Not on file   Food Insecurity: Not on file   Transportation Needs: Not on file   Physical Activity: Not on file   Stress: Not on file   Social Connections: Not on file   Intimate Partner Violence: Not on file   Housing Stability: Not on file        No family history on file.     REVIEW OF SYSTEMS (New symptoms):    Eyes:      Blurred vision:  No [x]/Yes []               Diplopia:   No [x]/Yes []               Vision loss:       No [x]/Yes []   Ears, nose, throat:             Hearing loss:    No [x]/Yes []      Vertigo:   No [x]/Yes []                       Swallowing problem:  No [x]/Yes []               Nose bleeds:   No [x]/Yes []      Voice hoarseness:  No [x]/Yes []  Respiratory:             Cough:   No [x]/Yes []      Pleuritic chest pain:  No [x]/Yes []                        Dyspnea:   No [x]/Yes []      Wheezing:   No [x]/Yes []  Cardiovascular:             Angina:   No [x]/Yes []      Palpitations:   No [x]/Yes []          Claudication:    No [x]/Yes []      Leg swelling:   No [x]/Yes []  Gastrointestinal:             Nausea or vomiting:  No [x]/Yes []               Abdominal pain:  No [x]/Yes []                     Intestinal bleeding: No [x]/Yes []  Musculoskeletal:             Leg pain:   No [x]/Yes []      Back pain:   No [x]/Yes []                    Weakness:   No [x]/Yes []  Neurologic:             Numbness:   No [x]/Yes []      Paralysis:   No [x]/Yes []                       Headaches:   No [x]/Yes []  Hematologic, lymphatic:   Anemia:   No [x]/Yes []              Bleeding or bruising:  No [x]/Yes []              Fevers or chills: No [x]/Yes []  Endocrine:             Temp intolerance:   No [x]/Yes []                       Polydipsia, polyuria:  No [x]/Yes []  Skin:              Rash:    No [x]/Yes []      Ulcers:   No [x]/Yes occludes at its proximal one third and does not reconstitute distally. The peroneal is a dominant runoff with tandem lesions at the takeoff just distal to the tibial peroneal trunk. The posterior tibial is diffusely diseased with multiple stenoses throughout its entire length with very poor filling distally. Surgeon: Cathi Cavazos MD     Assistant: None     Anesthesia: Local lidocaine     Estimated blood loss: Less than 20 cc     Procedure:  #1 abdominal aortogram with bilateral lower extremity runoff  #2 balloon angioplasty of the peroneal vessel with a 3 x 80 balloon  #3 percutaneous closure of the common femoral     Description of the procedure: Mindy benefits and alternatives were discussed with patient consent was achieved. Day the procedure the patient was brought to the Cath Lab and placed in supine position. She was prepped and draped in standard sterile fashion time was taken verify the person the operative site as well as the procedure. Fluoroscopy is used identify the femoral head indwelling prosthesis was noted. Ultrasound was used identify the common femoral vessel. Lidocaine was used infiltrate the skin and subcutaneous tissue. Micropuncture needle micropuncture wire and sheath were inserted under ultrasound. A Bentson wire was placed upsized to a 4 [place] sheath/flushed with heparinized saline solution. A pigtail catheter was brought up to the level suprarenal aortic position images were taken. There is an indwelling inferior vena cava filter. See above for findings. We then crossed over the aortic bifurcation with a pigtail catheter Bentson wire. Images were taken of left extremity see above. At this point there was severe tibial vessel disease. A Glidewire advantage was placed in the popliteal segment. We upsized to a 6 [place] sheath/flushed with heparinized saline solution the patient was heparinized see nursing notes.      We then used an angled glide catheter combined with an 014 angled Glidewire advantage. We able to select the peroneal vessel. We then delivered a 3 x 80 balloon just distal to the tibial peroneal trunk. Balloon angioplasty was performed for 3 minutes. Completion images demonstrated widely patent peroneal vessel. At this point I considered may be doing posterior tibial but is diffusely diseased throughout its entire length. I was concerned about knocking off the peroneal vessel therefore we refrained. I did select the anterior tibial vessel however the wire would not traverse. All catheters wires were then removed. Completion images demonstrate a widely patent peroneal vessel. We are in good position for percutaneous closure. Sheath shot was taken. A Pro-glide was deployed the wire was removed and pressure held     At the end of the procedure everything is accounted for the patient Toller procedure well. Electronically signed by Zackary Duncan MD on 9/19/2022 at 9:07 AM                 Admission (Discharged) from ABDOMINAL AORTOGRAM on 9/19/2022    Admission (Discharged) from ABDOMINAL AORTOGRAM on 9/19/2022  Problem List Items Addressed This Visit       Peripheral vascular disease of lower extremity with ulceration ([address]    Atherosclerosis of artery of extremity with gangrene ([address]     Other Visit Diagnoses       PVD (peripheral vascular disease) ([address]    -  Primary    Relevant Orders    VL LOWER EXTREMITY ARTERIAL SEGMENTAL PRESSURES W PPG    VL LOWER EXTREMITY ARTERIAL SEGMENTAL PRESSURES W PPG                #1 left lower extremity second toe ulcer/eschar/dry gangrene. She is status post revascularization. Overall she is doing well. We will get baseline ABIs she can call for the results.     Sydni Foy

## 2023-01-03 ENCOUNTER — HOSPITAL ENCOUNTER (OUTPATIENT)
Dept: INTERVENTIONAL RADIOLOGY/VASCULAR | Age: 68
Discharge: HOME | End: 2023-01-05
Payer: COMMERCIAL

## 2023-01-03 DIAGNOSIS — I73.9 PVD (PERIPHERAL VASCULAR DISEASE) (HCC): ICD-10-CM

## 2023-01-03 PROCEDURE — 93922 UPR/L XTREMITY ART 2 LEVELS: CPT

## 2023-01-12 ENCOUNTER — HOSPITAL ENCOUNTER (EMERGENCY)
Dept: HOSPITAL 83 - ED | Age: 68
Discharge: HOME | End: 2023-01-12
Payer: COMMERCIAL

## 2023-01-12 VITALS — BODY MASS INDEX: 25.88 KG/M2 | WEIGHT: 161 LBS | HEIGHT: 65.98 IN

## 2023-01-12 VITALS — DIASTOLIC BLOOD PRESSURE: 72 MMHG

## 2023-01-12 DIAGNOSIS — N39.0: ICD-10-CM

## 2023-01-12 DIAGNOSIS — E11.649: Primary | ICD-10-CM

## 2023-01-12 DIAGNOSIS — Z90.710: ICD-10-CM

## 2023-01-12 DIAGNOSIS — Z90.49: ICD-10-CM

## 2023-01-12 DIAGNOSIS — M19.90: ICD-10-CM

## 2023-01-12 DIAGNOSIS — Z88.0: ICD-10-CM

## 2023-01-12 DIAGNOSIS — Z79.899: ICD-10-CM

## 2023-01-12 DIAGNOSIS — Z88.1: ICD-10-CM

## 2023-01-12 DIAGNOSIS — Z98.890: ICD-10-CM

## 2023-01-12 DIAGNOSIS — E03.9: ICD-10-CM

## 2023-01-12 LAB
ALP SERPL-CCNC: 84 U/L (ref 46–116)
ALT SERPL W P-5'-P-CCNC: 12 U/L (ref 10–49)
BACTERIA #/AREA URNS HPF: (no result) /[HPF]
BASOPHILS # BLD AUTO: 0 10*3/UL (ref 0–0.1)
BASOPHILS NFR BLD AUTO: 0.6 % (ref 0–1)
BUN SERPL-MCNC: 14 MG/DL (ref 9–23)
CHLORIDE SERPL-SCNC: 101 MMOL/L (ref 98–107)
EOSINOPHIL # BLD AUTO: 0.1 10*3/UL (ref 0–0.4)
EOSINOPHIL # BLD AUTO: 1.1 % (ref 1–4)
EPI CELLS #/AREA URNS HPF: (no result) /[HPF]
ERYTHROCYTE [DISTWIDTH] IN BLOOD BY AUTOMATED COUNT: 14.9 % (ref 0–14.5)
GLUCOSE UR QL: (no result)
HCT VFR BLD AUTO: 39.3 % (ref 37–47)
INR BLD: 1 (ref 2–3.5)
LEUKOCYTE ESTERASE UR QL STRIP: (no result)
LYMPHOCYTES # BLD AUTO: 2.1 10*3/UL (ref 1.3–4.4)
LYMPHOCYTES NFR BLD AUTO: 33.4 % (ref 27–41)
MCH RBC QN AUTO: 23.6 PG (ref 27–31)
MCHC RBC AUTO-ENTMCNC: 30 G/DL (ref 33–37)
MCV RBC AUTO: 78.8 FL (ref 81–99)
MONOCYTES # BLD AUTO: 0.6 10*3/UL (ref 0.1–1)
MONOCYTES NFR BLD MANUAL: 8.9 % (ref 3–9)
NEUT #: 3.6 10*3/UL (ref 2.3–7.9)
NEUT %: 55.5 % (ref 47–73)
NRBC BLD QL AUTO: 0 10*3/UL (ref 0–0)
PH UR STRIP: 6.5 [PH] (ref 4.5–8)
PLATELET # BLD AUTO: 273 10*3/UL (ref 130–400)
PMV BLD AUTO: 9.2 FL (ref 9.6–12.3)
POTASSIUM SERPL-SCNC: 4 MMOL/L (ref 3.4–5.1)
PROT SERPL-MCNC: 6.8 GM/DL (ref 6–8)
RBC # BLD AUTO: 4.99 10*6/UL (ref 4.1–5.1)
RBC #/AREA URNS HPF: (no result) RBC/HPF (ref 0–2)
SP GR UR: 1.01 (ref 1–1.03)
UROBILINOGEN UR STRIP-MCNC: 0.2 E.U./DL (ref 0–1)
WBC #/AREA URNS HPF: (no result) WBC/HPF (ref 0–5)
WBC NRBC COR # BLD AUTO: 6.4 10*3/UL (ref 4.8–10.8)

## 2023-01-21 DIAGNOSIS — L97.909 PERIPHERAL VASCULAR DISEASE OF LOWER EXTREMITY WITH ULCERATION (HCC): ICD-10-CM

## 2023-01-21 DIAGNOSIS — I73.9 PERIPHERAL VASCULAR DISEASE OF LOWER EXTREMITY WITH ULCERATION (HCC): ICD-10-CM

## 2023-01-23 RX ORDER — CLOPIDOGREL BISULFATE 75 MG/1
75 TABLET ORAL DAILY
Qty: 30 TABLET | Refills: 4 | Status: SHIPPED | OUTPATIENT
Start: 2023-01-23 | End: 2023-05-23

## 2023-05-08 ENCOUNTER — HOSPITAL ENCOUNTER (OUTPATIENT)
Dept: HOSPITAL 83 - MAMMO | Age: 68
Discharge: HOME | End: 2023-05-08
Attending: PHYSICIAN ASSISTANT
Payer: COMMERCIAL

## 2023-05-08 DIAGNOSIS — N64.9: ICD-10-CM

## 2023-05-08 DIAGNOSIS — Z12.31: Primary | ICD-10-CM

## 2023-06-18 ENCOUNTER — HOSPITAL ENCOUNTER (EMERGENCY)
Dept: HOSPITAL 83 - ED | Age: 68
Discharge: HOME | End: 2023-06-18
Payer: COMMERCIAL

## 2023-06-18 VITALS — WEIGHT: 160 LBS | BODY MASS INDEX: 25.71 KG/M2 | HEIGHT: 65.98 IN

## 2023-06-18 VITALS — DIASTOLIC BLOOD PRESSURE: 83 MMHG

## 2023-06-18 DIAGNOSIS — F32.A: ICD-10-CM

## 2023-06-18 DIAGNOSIS — I10: ICD-10-CM

## 2023-06-18 DIAGNOSIS — Z79.4: ICD-10-CM

## 2023-06-18 DIAGNOSIS — Z98.890: ICD-10-CM

## 2023-06-18 DIAGNOSIS — E03.9: ICD-10-CM

## 2023-06-18 DIAGNOSIS — K52.9: ICD-10-CM

## 2023-06-18 DIAGNOSIS — Z88.0: ICD-10-CM

## 2023-06-18 DIAGNOSIS — F41.9: ICD-10-CM

## 2023-06-18 DIAGNOSIS — M19.90: ICD-10-CM

## 2023-06-18 DIAGNOSIS — E11.9: ICD-10-CM

## 2023-06-18 DIAGNOSIS — M79.7: ICD-10-CM

## 2023-06-18 DIAGNOSIS — Z96.653: ICD-10-CM

## 2023-06-18 DIAGNOSIS — R30.0: Primary | ICD-10-CM

## 2023-06-18 DIAGNOSIS — Z88.1: ICD-10-CM

## 2023-06-18 DIAGNOSIS — Z90.49: ICD-10-CM

## 2023-06-18 DIAGNOSIS — Z90.710: ICD-10-CM

## 2023-06-18 LAB
ALP SERPL-CCNC: 78 U/L (ref 46–116)
ALT SERPL W P-5'-P-CCNC: 7 U/L (ref 10–49)
BACTERIA #/AREA URNS HPF: (no result) /[HPF]
BASOPHILS # BLD AUTO: 0.1 10*3/UL (ref 0–0.1)
BASOPHILS NFR BLD AUTO: 0.8 % (ref 0–1)
BUN SERPL-MCNC: 17 MG/DL (ref 9–23)
CHLORIDE SERPL-SCNC: 105 MMOL/L (ref 98–107)
EOSINOPHIL # BLD AUTO: 0.1 10*3/UL (ref 0–0.4)
EOSINOPHIL # BLD AUTO: 0.7 % (ref 1–4)
EPI CELLS #/AREA URNS HPF: (no result) /[HPF]
ERYTHROCYTE [DISTWIDTH] IN BLOOD BY AUTOMATED COUNT: 16.9 % (ref 0–14.5)
HCT VFR BLD AUTO: 34.7 % (ref 37–47)
HGB UR QL STRIP: (no result)
LEUKOCYTE ESTERASE UR QL STRIP: (no result)
LIPASE SERPL-CCNC: 30 U/L (ref 12–53)
LYMPHOCYTES # BLD AUTO: 2.5 10*3/UL (ref 1.3–4.4)
LYMPHOCYTES NFR BLD AUTO: 33 % (ref 27–41)
MCH RBC QN AUTO: 25.2 PG (ref 27–31)
MCHC RBC AUTO-ENTMCNC: 31.1 G/DL (ref 33–37)
MCV RBC AUTO: 81.1 FL (ref 81–99)
MONOCYTES # BLD AUTO: 0.8 10*3/UL (ref 0.1–1)
MONOCYTES NFR BLD MANUAL: 10.7 % (ref 3–9)
NEUT #: 4.1 10*3/UL (ref 2.3–7.9)
NEUT %: 54.5 % (ref 47–73)
NRBC BLD QL AUTO: 0 10*3/UL (ref 0–0)
PH UR STRIP: 5.5 [PH] (ref 4.5–8)
PLATELET # BLD AUTO: 293 10*3/UL (ref 130–400)
PMV BLD AUTO: 9 FL (ref 9.6–12.3)
POTASSIUM SERPL-SCNC: 4 MMOL/L (ref 3.4–5.1)
PROT SERPL-MCNC: 6.4 GM/DL (ref 6–8)
RBC # BLD AUTO: 4.28 10*6/UL (ref 4.1–5.1)
RBC #/AREA URNS HPF: (no result) RBC/HPF (ref 0–2)
SP GR UR: 1.01 (ref 1–1.03)
UROBILINOGEN UR STRIP-MCNC: 1 E.U./DL (ref 0–1)
WBC #/AREA URNS HPF: (no result) WBC/HPF (ref 0–5)
WBC NRBC COR # BLD AUTO: 7.5 10*3/UL (ref 4.8–10.8)
YEAST #/AREA URNS HPF: (no result) /[HPF]

## 2023-06-21 DIAGNOSIS — I73.9 PERIPHERAL VASCULAR DISEASE OF LOWER EXTREMITY WITH ULCERATION (HCC): ICD-10-CM

## 2023-06-21 DIAGNOSIS — L97.909 PERIPHERAL VASCULAR DISEASE OF LOWER EXTREMITY WITH ULCERATION (HCC): ICD-10-CM

## 2023-06-21 RX ORDER — CLOPIDOGREL BISULFATE 75 MG/1
75 TABLET ORAL DAILY
Qty: 30 TABLET | Refills: 4 | OUTPATIENT
Start: 2023-06-21 | End: 2023-11-18

## 2023-06-22 NOTE — TELEPHONE ENCOUNTER
Left message on voicemail, from the vascular standpoint she does not need to be on Plavix. Can begin taking 1 81 mg aspirin daily.

## 2023-07-18 DIAGNOSIS — I73.9 PERIPHERAL VASCULAR DISEASE OF LOWER EXTREMITY WITH ULCERATION (HCC): Primary | ICD-10-CM

## 2023-07-18 DIAGNOSIS — L97.909 PERIPHERAL VASCULAR DISEASE OF LOWER EXTREMITY WITH ULCERATION (HCC): Primary | ICD-10-CM

## 2023-07-24 ENCOUNTER — HOSPITAL ENCOUNTER (OUTPATIENT)
Dept: HOSPITAL 83 - LAB | Age: 68
Discharge: HOME | End: 2023-07-24
Attending: INTERNAL MEDICINE
Payer: COMMERCIAL

## 2023-07-24 DIAGNOSIS — K74.60: Primary | ICD-10-CM

## 2023-07-24 LAB
ALP SERPL-CCNC: 98 U/L (ref 46–116)
ALT SERPL W P-5'-P-CCNC: < 7 U/L (ref 10–49)
BASOPHILS # BLD AUTO: 0 10*3/UL (ref 0–0.1)
BASOPHILS NFR BLD AUTO: 0.4 % (ref 0–1)
BUN SERPL-MCNC: 17 MG/DL (ref 9–23)
CHLORIDE SERPL-SCNC: 102 MMOL/L (ref 98–107)
EOSINOPHIL # BLD AUTO: 0.1 10*3/UL (ref 0–0.4)
EOSINOPHIL # BLD AUTO: 1.4 % (ref 1–4)
ERYTHROCYTE [DISTWIDTH] IN BLOOD BY AUTOMATED COUNT: 16.9 % (ref 0–14.5)
HCT VFR BLD AUTO: 35.6 % (ref 37–47)
INR BLD: 1.1 (ref 2–3.5)
LYMPHOCYTES # BLD AUTO: 2.4 10*3/UL (ref 1.3–4.4)
LYMPHOCYTES NFR BLD AUTO: 23.5 % (ref 27–41)
MCH RBC QN AUTO: 25.4 PG (ref 27–31)
MCHC RBC AUTO-ENTMCNC: 30.6 G/DL (ref 33–37)
MCV RBC AUTO: 83 FL (ref 81–99)
MONOCYTES # BLD AUTO: 1 10*3/UL (ref 0.1–1)
MONOCYTES NFR BLD MANUAL: 10 % (ref 3–9)
NEUT #: 6.6 10*3/UL (ref 2.3–7.9)
NEUT %: 63.7 % (ref 47–73)
NRBC BLD QL AUTO: 0 % (ref 0–0)
PLATELET # BLD AUTO: 371 10*3/UL (ref 130–400)
PMV BLD AUTO: 8.7 FL (ref 9.6–12.3)
POTASSIUM SERPL-SCNC: 4.6 MMOL/L (ref 3.4–5.1)
PROT SERPL-MCNC: 7.2 GM/DL (ref 6–8)
RBC # BLD AUTO: 4.29 10*6/UL (ref 4.1–5.1)
WBC NRBC COR # BLD AUTO: 10.4 10*3/UL (ref 4.8–10.8)

## 2023-07-25 LAB
ACTIN IGG SERPL-ACNC: 3 UNITS (ref 0–19)
AFP-TM SERPL-MCNC: 2.5 NG/ML (ref 0–9.2)

## 2023-07-26 ENCOUNTER — OFFICE VISIT (OUTPATIENT)
Dept: VASCULAR SURGERY | Age: 68
End: 2023-07-26
Payer: COMMERCIAL

## 2023-07-26 ENCOUNTER — HOSPITAL ENCOUNTER (OUTPATIENT)
Dept: CARDIOLOGY | Age: 68
Discharge: HOME | End: 2023-07-26
Payer: COMMERCIAL

## 2023-07-26 DIAGNOSIS — I73.9 PERIPHERAL VASCULAR DISEASE OF LOWER EXTREMITY WITH ULCERATION (HCC): ICD-10-CM

## 2023-07-26 DIAGNOSIS — L97.909 PERIPHERAL VASCULAR DISEASE OF LOWER EXTREMITY WITH ULCERATION (HCC): ICD-10-CM

## 2023-07-26 DIAGNOSIS — L97.909 PERIPHERAL VASCULAR DISEASE OF LOWER EXTREMITY WITH ULCERATION (HCC): Primary | ICD-10-CM

## 2023-07-26 DIAGNOSIS — I73.9 PERIPHERAL VASCULAR DISEASE OF LOWER EXTREMITY WITH ULCERATION (HCC): Primary | ICD-10-CM

## 2023-07-26 PROCEDURE — 99213 OFFICE O/P EST LOW 20 MIN: CPT

## 2023-07-26 PROCEDURE — G8420 CALC BMI NORM PARAMETERS: HCPCS

## 2023-07-26 PROCEDURE — 1123F ACP DISCUSS/DSCN MKR DOCD: CPT

## 2023-07-26 PROCEDURE — G8400 PT W/DXA NO RESULTS DOC: HCPCS

## 2023-07-26 PROCEDURE — 1036F TOBACCO NON-USER: CPT

## 2023-07-26 PROCEDURE — 93923 UPR/LXTR ART STDY 3+ LVLS: CPT

## 2023-07-26 PROCEDURE — 1090F PRES/ABSN URINE INCON ASSESS: CPT

## 2023-07-26 PROCEDURE — 3017F COLORECTAL CA SCREEN DOC REV: CPT

## 2023-07-26 PROCEDURE — G8427 DOCREV CUR MEDS BY ELIG CLIN: HCPCS

## 2023-07-26 RX ORDER — ASPIRIN 81 MG/1
81 TABLET ORAL DAILY
COMMUNITY

## 2023-07-26 NOTE — PROGRESS NOTES
Vascular Surgery Outpatient Progress Note    Chief Complaint   Patient presents with    Circulatory Problem     Follow up PVD. HISTORY OF PRESENT ILLNESS:                The patient is a 76 y.o. female who is here for follow up of PVD. She has a history of left lower extremity angiogram with Dr. Bev Tilley in 9/2022. She had a second toe amputation with podiatry after and this has healed. Pt has RA and neuropathy that limits her walking. She constantly has pain in the feet and gets pain all over her legs while walking. She denies any claudication type symptoms. She has no open wounds. She has a history of left second toe amputation in the past which is completely healed. She is not a smoker. She has a history of diabetes. Pt with a history of DVT. She takes asa daily. Past Medical History:        Diagnosis Date    Arthritis     DM (diabetes mellitus) ([facility])     Hypothyroid      Past Surgical History:        Procedure Laterality Date    GALLBLADDER SURGERY      HIP SURGERY      HYSTERECTOMY (CERVIX STATUS UNKNOWN)      KNEE SURGERY       Current Medications:   Prior to Admission medications    Medication Sig Start Date End Date Taking?  Authorizing Provider   metFORMIN (GLUCOPHAGE) 1000 MG tablet 1 tablet with meals   Yes Historical Provider, MD   Calcium Carb-Cholecalciferol (OYSTER SHELL CALCIUM/VIT D PO) Take by mouth   Yes Historical Provider, MD   aspirin 81 MG EC tablet Take 1 tablet by mouth daily   Yes Historical Provider, MD   levothyroxine (SYNTHROID) 100 MCG tablet Take 1 tablet by mouth Daily   Yes Historical Provider, MD   vitamin B-12 (CYANOCOBALAMIN) 500 MCG tablet Take 1 tablet by mouth daily   Yes Historical Provider, MD   insulin lispro (HUMALOG) 100 UNIT/ML injection vial Inject into the skin 3 times daily (before meals) Sliding scale as needed   Yes Historical Provider, MD   Multiple Vitamins-Minerals (THERAPEUTIC MULTIVITAMIN-MINERALS) tablet Take 1 tablet by mouth daily
[Time Spent: ___ minutes] : I have spent [unfilled] minutes of time on the encounter.

## 2023-07-27 NOTE — PROCEDURES
[address], [address] [place], [place] [address]                                VASCULAR REPORT    PATIENT NAME: Magdalena Benitez                 :        1955  MED REC NO:   [id number]                            ROOM:  ACCOUNT NO:   [de-identified]                           ADMIT DATE: 2023  PROVIDER:     Opal Barnett MD    DATE OF PROCEDURE:  2023    LOWER EXTREMITY ARTERIAL DOPPLER STUDY    INDICATION:  Constant pain in both legs. FINDINGS:  The lower extremity arterial Doppler revealed on the right  side, the ankle-brachial index is normal with normal triphasic ankle  Doppler tracings and on the left side, Doppler evidence of mild  femoropopliteal arterial occlusive disease with ankle-brachial index of  0.89 with good arterial flow to both feet based upon the ankle Doppler  tracings and pulse volume recordings.       Opal Barnett MD    D: 2023 16:38:25       T: 2023 16:42:01     [id number]  Job#: [id number]     Doc#: [id number]

## 2023-08-14 RX ORDER — SULFASALAZINE 500 MG/1
TABLET ORAL
Qty: 360 TABLET | Refills: 1 | OUTPATIENT
Start: 2023-08-14

## 2024-02-08 ENCOUNTER — LAB REQUISITION (OUTPATIENT)
Dept: LAB | Facility: HOSPITAL | Age: 69
End: 2024-02-08

## 2024-02-08 PROCEDURE — 88112 CYTOPATH CELL ENHANCE TECH: CPT | Performed by: PATHOLOGY

## 2024-02-08 PROCEDURE — 88112 CYTOPATH CELL ENHANCE TECH: CPT

## 2024-02-09 LAB
LABORATORY COMMENT REPORT: NORMAL
LABORATORY COMMENT REPORT: NORMAL
PATH REPORT.FINAL DX SPEC: NORMAL
PATH REPORT.GROSS SPEC: NORMAL
PATH REPORT.TOTAL CANCER: NORMAL

## 2024-03-09 ENCOUNTER — HOSPITAL ENCOUNTER (EMERGENCY)
Dept: HOSPITAL 83 - ED | Age: 69
Discharge: HOME | End: 2024-03-09
Payer: COMMERCIAL

## 2024-03-09 VITALS — BODY MASS INDEX: 22.5 KG/M2 | WEIGHT: 140 LBS | HEIGHT: 65.98 IN

## 2024-03-09 DIAGNOSIS — Z90.711: ICD-10-CM

## 2024-03-09 DIAGNOSIS — Z88.1: ICD-10-CM

## 2024-03-09 DIAGNOSIS — Z79.899: ICD-10-CM

## 2024-03-09 DIAGNOSIS — Z79.4: ICD-10-CM

## 2024-03-09 DIAGNOSIS — Z90.49: ICD-10-CM

## 2024-03-09 DIAGNOSIS — Z79.2: ICD-10-CM

## 2024-03-09 DIAGNOSIS — H10.9: Primary | ICD-10-CM

## 2024-03-09 DIAGNOSIS — Z98.890: ICD-10-CM

## 2024-03-09 DIAGNOSIS — Z79.82: ICD-10-CM

## 2024-03-09 DIAGNOSIS — Z96.653: ICD-10-CM

## 2024-03-09 DIAGNOSIS — Z96.643: ICD-10-CM

## 2024-03-09 DIAGNOSIS — Z88.0: ICD-10-CM

## 2024-03-22 ENCOUNTER — HOSPITAL ENCOUNTER (OUTPATIENT)
Dept: HOSPITAL 83 - TRNFUSION | Age: 69
Discharge: HOME | End: 2024-03-22
Attending: PHYSICIAN ASSISTANT
Payer: COMMERCIAL

## 2024-03-22 VITALS — DIASTOLIC BLOOD PRESSURE: 70 MMHG

## 2024-03-22 VITALS — DIASTOLIC BLOOD PRESSURE: 72 MMHG

## 2024-03-22 VITALS — SYSTOLIC BLOOD PRESSURE: 145 MMHG | DIASTOLIC BLOOD PRESSURE: 69 MMHG

## 2024-03-22 VITALS — DIASTOLIC BLOOD PRESSURE: 67 MMHG

## 2024-03-22 VITALS — SYSTOLIC BLOOD PRESSURE: 133 MMHG | DIASTOLIC BLOOD PRESSURE: 71 MMHG

## 2024-03-22 VITALS — DIASTOLIC BLOOD PRESSURE: 71 MMHG

## 2024-03-22 VITALS — DIASTOLIC BLOOD PRESSURE: 71 MMHG | SYSTOLIC BLOOD PRESSURE: 133 MMHG

## 2024-03-22 VITALS — DIASTOLIC BLOOD PRESSURE: 64 MMHG

## 2024-03-22 VITALS — DIASTOLIC BLOOD PRESSURE: 64 MMHG | SYSTOLIC BLOOD PRESSURE: 138 MMHG

## 2024-03-22 VITALS — SYSTOLIC BLOOD PRESSURE: 105 MMHG | DIASTOLIC BLOOD PRESSURE: 63 MMHG

## 2024-03-22 VITALS — DIASTOLIC BLOOD PRESSURE: 58 MMHG

## 2024-03-22 DIAGNOSIS — Z98.890: ICD-10-CM

## 2024-03-22 DIAGNOSIS — E03.9: ICD-10-CM

## 2024-03-22 DIAGNOSIS — Z79.4: ICD-10-CM

## 2024-03-22 DIAGNOSIS — Z90.710: ICD-10-CM

## 2024-03-22 DIAGNOSIS — Z86.73: ICD-10-CM

## 2024-03-22 DIAGNOSIS — Z90.49: ICD-10-CM

## 2024-03-22 DIAGNOSIS — E11.9: ICD-10-CM

## 2024-03-22 DIAGNOSIS — M06.9: ICD-10-CM

## 2024-03-22 DIAGNOSIS — F32.A: ICD-10-CM

## 2024-03-22 DIAGNOSIS — D64.9: Primary | ICD-10-CM

## 2024-03-22 DIAGNOSIS — F41.9: ICD-10-CM

## 2024-03-22 DIAGNOSIS — I10: ICD-10-CM

## 2024-03-25 ENCOUNTER — HOSPITAL ENCOUNTER (OUTPATIENT)
Dept: HOSPITAL 83 - LAB | Age: 69
Discharge: HOME | End: 2024-03-25
Attending: PHYSICIAN ASSISTANT
Payer: COMMERCIAL

## 2024-03-25 DIAGNOSIS — Z91.89: ICD-10-CM

## 2024-03-25 DIAGNOSIS — D84.9: ICD-10-CM

## 2024-03-25 DIAGNOSIS — R79.9: ICD-10-CM

## 2024-03-25 DIAGNOSIS — Z94.0: ICD-10-CM

## 2024-03-25 DIAGNOSIS — D64.9: Primary | ICD-10-CM

## 2024-03-25 DIAGNOSIS — Z79.899: ICD-10-CM

## 2024-03-25 LAB
ERYTHROCYTE [DISTWIDTH] IN BLOOD BY AUTOMATED COUNT: 15.3 % (ref 0–14.5)
HCT VFR BLD AUTO: 37.1 % (ref 37–47)
MCH RBC QN AUTO: 26 PG (ref 27–31)
MCHC RBC AUTO-ENTMCNC: 30.2 G/DL (ref 33–37)
MCV RBC AUTO: 86.3 FL (ref 81–99)
NRBC BLD QL AUTO: 0 10*3/UL (ref 0–0)
PLATELET # BLD AUTO: 234 10*3/UL (ref 130–400)
PMV BLD AUTO: 8.7 FL (ref 9.6–12.3)
RBC # BLD AUTO: 4.3 10*6/UL (ref 4.1–5.1)
WBC NRBC COR # BLD AUTO: 5.6 10*3/UL (ref 4.8–10.8)

## 2024-07-10 ENCOUNTER — HOSPITAL ENCOUNTER (EMERGENCY)
Dept: HOSPITAL 83 - ED | Age: 69
LOS: 1 days | Discharge: ANOTHER HOSPITAL | End: 2024-07-11
Payer: COMMERCIAL

## 2024-07-10 VITALS — BODY MASS INDEX: 23.9 KG/M2 | WEIGHT: 140 LBS | HEIGHT: 63.98 IN

## 2024-07-10 DIAGNOSIS — Z98.890: ICD-10-CM

## 2024-07-10 DIAGNOSIS — Z96.653: ICD-10-CM

## 2024-07-10 DIAGNOSIS — N39.0: ICD-10-CM

## 2024-07-10 DIAGNOSIS — F41.9: ICD-10-CM

## 2024-07-10 DIAGNOSIS — A41.9: ICD-10-CM

## 2024-07-10 DIAGNOSIS — Z88.0: ICD-10-CM

## 2024-07-10 DIAGNOSIS — K83.8: Primary | ICD-10-CM

## 2024-07-10 DIAGNOSIS — E03.9: ICD-10-CM

## 2024-07-10 DIAGNOSIS — E11.9: ICD-10-CM

## 2024-07-10 DIAGNOSIS — R11.0: ICD-10-CM

## 2024-07-10 DIAGNOSIS — M79.7: ICD-10-CM

## 2024-07-10 DIAGNOSIS — Z90.710: ICD-10-CM

## 2024-07-10 DIAGNOSIS — M19.90: ICD-10-CM

## 2024-07-10 DIAGNOSIS — Z90.49: ICD-10-CM

## 2024-07-10 DIAGNOSIS — Z88.8: ICD-10-CM

## 2024-07-10 DIAGNOSIS — K21.9: ICD-10-CM

## 2024-07-10 DIAGNOSIS — F32.A: ICD-10-CM

## 2024-07-10 LAB
ALT SERPL W P-5'-P-CCNC: < 7 U/L (ref 5–49)
BASOPHILS # BLD AUTO: 0.1 10*3/UL (ref 0–0.1)
BASOPHILS NFR BLD AUTO: 0.4 % (ref 0–1)
BUN SERPL-MCNC: 36 MG/DL (ref 9–23)
CHLORIDE SERPL-SCNC: 104 MMOL/L (ref 98–107)
EOSINOPHIL # BLD AUTO: 0.1 10*3/UL (ref 0–0.4)
EOSINOPHIL # BLD AUTO: 0.6 % (ref 1–4)
ERYTHROCYTE [DISTWIDTH] IN BLOOD BY AUTOMATED COUNT: 15.2 % (ref 0–14.5)
HCT VFR BLD AUTO: 38.2 % (ref 37–47)
HGB UR QL STRIP: (no result)
LEUKOCYTE ESTERASE UR QL STRIP: (no result)
LIPASE SERPL-CCNC: 28 U/L (ref 12–53)
LYMPHOCYTES # BLD AUTO: 1.1 10*3/UL (ref 1.3–4.4)
LYMPHOCYTES NFR BLD AUTO: 7.7 % (ref 27–41)
MCH RBC QN AUTO: 27.2 PG (ref 27–31)
MCHC RBC AUTO-ENTMCNC: 31.2 G/DL (ref 33–37)
MCV RBC AUTO: 87.4 FL (ref 81–99)
MONOCYTES # BLD AUTO: 0.8 10*3/UL (ref 0.1–1)
MONOCYTES NFR BLD MANUAL: 6.1 % (ref 3–9)
NEUT #: 11.8 10*3/UL (ref 2.3–7.9)
NEUT %: 84.9 % (ref 47–73)
NRBC BLD QL AUTO: 0 10*3/UL (ref 0–0)
PH UR STRIP: 6 [PH] (ref 4.5–8)
PLATELET # BLD AUTO: 273 10*3/UL (ref 130–400)
PMV BLD AUTO: 8.7 FL (ref 9.6–12.3)
POTASSIUM SERPL-SCNC: 3.7 MMOL/L (ref 3.4–5.1)
RBC # BLD AUTO: 4.37 10*6/UL (ref 4.1–5.1)
SP GR UR: 1.02 (ref 1–1.03)
UROBILINOGEN UR STRIP-MCNC: 0.2 E.U./DL (ref 0–1)
WBC #/AREA URNS HPF: (no result) WBC/HPF (ref 0–5)
WBC NRBC COR # BLD AUTO: 13.9 10*3/UL (ref 4.8–10.8)

## 2024-07-11 VITALS — SYSTOLIC BLOOD PRESSURE: 98 MMHG | DIASTOLIC BLOOD PRESSURE: 50 MMHG

## 2024-07-15 DIAGNOSIS — I73.9 PVD (PERIPHERAL VASCULAR DISEASE) (HCC): Primary | ICD-10-CM

## 2024-08-21 ENCOUNTER — HOSPITAL ENCOUNTER (EMERGENCY)
Dept: HOSPITAL 83 - ED | Age: 69
Discharge: NURSING, REHAB OR LONG-TERM CARE | End: 2024-08-21
Payer: COMMERCIAL

## 2024-08-21 VITALS — DIASTOLIC BLOOD PRESSURE: 61 MMHG | SYSTOLIC BLOOD PRESSURE: 99 MMHG

## 2024-08-21 VITALS — BODY MASS INDEX: 23.39 KG/M2 | WEIGHT: 137 LBS | HEIGHT: 63.98 IN

## 2024-08-21 DIAGNOSIS — R00.1: ICD-10-CM

## 2024-08-21 DIAGNOSIS — E83.42: ICD-10-CM

## 2024-08-21 DIAGNOSIS — E78.5: ICD-10-CM

## 2024-08-21 DIAGNOSIS — F32.A: ICD-10-CM

## 2024-08-21 DIAGNOSIS — Z96.653: ICD-10-CM

## 2024-08-21 DIAGNOSIS — Z88.1: ICD-10-CM

## 2024-08-21 DIAGNOSIS — Z86.718: ICD-10-CM

## 2024-08-21 DIAGNOSIS — Z90.49: ICD-10-CM

## 2024-08-21 DIAGNOSIS — R55: ICD-10-CM

## 2024-08-21 DIAGNOSIS — I25.10: Primary | ICD-10-CM

## 2024-08-21 DIAGNOSIS — E03.9: ICD-10-CM

## 2024-08-21 DIAGNOSIS — E11.65: ICD-10-CM

## 2024-08-21 DIAGNOSIS — Z90.710: ICD-10-CM

## 2024-08-21 DIAGNOSIS — M19.90: ICD-10-CM

## 2024-08-21 DIAGNOSIS — D64.9: ICD-10-CM

## 2024-08-21 DIAGNOSIS — Z98.890: ICD-10-CM

## 2024-08-21 DIAGNOSIS — Z88.0: ICD-10-CM

## 2024-08-21 DIAGNOSIS — M79.7: ICD-10-CM

## 2024-08-21 DIAGNOSIS — E87.5: ICD-10-CM

## 2024-08-21 DIAGNOSIS — I10: ICD-10-CM

## 2024-08-21 DIAGNOSIS — F41.9: ICD-10-CM

## 2024-08-21 LAB
BASOPHILS # BLD AUTO: 0 10*3/UL (ref 0–0.1)
BASOPHILS NFR BLD AUTO: 0.6 % (ref 0–1)
BUN SERPL-MCNC: 24 MG/DL (ref 9–23)
CHLORIDE SERPL-SCNC: 99 MMOL/L (ref 98–107)
EOSINOPHIL # BLD AUTO: 0.1 10*3/UL (ref 0–0.4)
EOSINOPHIL # BLD AUTO: 1.4 % (ref 1–4)
ERYTHROCYTE [DISTWIDTH] IN BLOOD BY AUTOMATED COUNT: 17.1 % (ref 0–14.5)
HCT VFR BLD AUTO: 32.8 % (ref 37–47)
LYMPHOCYTES # BLD AUTO: 1.6 10*3/UL (ref 1.3–4.4)
LYMPHOCYTES NFR BLD AUTO: 31.5 % (ref 27–41)
MCH RBC QN AUTO: 27.8 PG (ref 27–31)
MCHC RBC AUTO-ENTMCNC: 29.6 G/DL (ref 33–37)
MCV RBC AUTO: 94 FL (ref 81–99)
MONOCYTES # BLD AUTO: 0.5 10*3/UL (ref 0.1–1)
MONOCYTES NFR BLD MANUAL: 9.8 % (ref 3–9)
NEUT #: 2.9 10*3/UL (ref 2.3–7.9)
NEUT %: 56.5 % (ref 47–73)
NRBC BLD QL AUTO: 0 10*3/UL (ref 0–0)
PLATELET # BLD AUTO: 246 10*3/UL (ref 130–400)
PMV BLD AUTO: 9.2 FL (ref 9.6–12.3)
POTASSIUM SERPL-SCNC: 4.7 MMOL/L (ref 3.4–5.1)
RBC # BLD AUTO: 3.49 10*6/UL (ref 4.1–5.1)
WBC NRBC COR # BLD AUTO: 5.2 10*3/UL (ref 4.8–10.8)